# Patient Record
Sex: FEMALE | Race: OTHER | NOT HISPANIC OR LATINO | Employment: PART TIME | URBAN - METROPOLITAN AREA
[De-identification: names, ages, dates, MRNs, and addresses within clinical notes are randomized per-mention and may not be internally consistent; named-entity substitution may affect disease eponyms.]

---

## 2017-02-06 ENCOUNTER — LAB (OUTPATIENT)
Dept: INTERNAL MEDICINE | Facility: CLINIC | Age: 22
End: 2017-02-06

## 2017-02-06 DIAGNOSIS — E07.9 THYROID DISEASE: Primary | ICD-10-CM

## 2017-02-06 PROCEDURE — 36415 COLL VENOUS BLD VENIPUNCTURE: CPT | Performed by: INTERNAL MEDICINE

## 2017-02-21 DIAGNOSIS — E03.9 HYPOTHYROIDISM: ICD-10-CM

## 2017-02-21 RX ORDER — LEVOTHYROXINE SODIUM 0.07 MG/1
TABLET ORAL
Qty: 30 TABLET | Refills: 2 | Status: SHIPPED | OUTPATIENT
Start: 2017-02-21 | End: 2017-06-07 | Stop reason: SDUPTHER

## 2017-06-07 ENCOUNTER — LAB (OUTPATIENT)
Dept: INTERNAL MEDICINE | Facility: CLINIC | Age: 22
End: 2017-06-07

## 2017-06-07 DIAGNOSIS — R53.83 OTHER FATIGUE: Primary | ICD-10-CM

## 2017-06-07 DIAGNOSIS — E03.8 OTHER SPECIFIED HYPOTHYROIDISM: ICD-10-CM

## 2017-06-07 LAB — TSH SERPL DL<=0.05 MIU/L-ACNC: 5.2 MIU/ML (ref 0.35–5.35)

## 2017-06-07 PROCEDURE — 84443 ASSAY THYROID STIM HORMONE: CPT | Performed by: INTERNAL MEDICINE

## 2017-06-07 RX ORDER — LEVOTHYROXINE SODIUM 0.07 MG/1
75 TABLET ORAL DAILY
Qty: 30 TABLET | Refills: 2 | Status: SHIPPED | OUTPATIENT
Start: 2017-06-07 | End: 2017-09-01 | Stop reason: SDUPTHER

## 2017-08-25 DIAGNOSIS — H40.009 GLAUCOMA SUSPECT, UNSPECIFIED LATERALITY: Primary | ICD-10-CM

## 2017-09-01 DIAGNOSIS — E03.8 OTHER SPECIFIED HYPOTHYROIDISM: ICD-10-CM

## 2017-09-01 RX ORDER — LEVOTHYROXINE SODIUM 0.07 MG/1
TABLET ORAL
Qty: 30 TABLET | Refills: 2 | Status: SHIPPED | OUTPATIENT
Start: 2017-09-01 | End: 2017-10-23 | Stop reason: SDUPTHER

## 2017-10-23 DIAGNOSIS — E03.8 OTHER SPECIFIED HYPOTHYROIDISM: ICD-10-CM

## 2017-10-23 RX ORDER — LEVOTHYROXINE SODIUM 0.07 MG/1
75 TABLET ORAL DAILY
Qty: 30 TABLET | Refills: 2 | Status: SHIPPED | OUTPATIENT
Start: 2017-10-23 | End: 2018-01-18 | Stop reason: SDUPTHER

## 2017-10-23 RX ORDER — AMOXICILLIN 500 MG/1
500 CAPSULE ORAL 3 TIMES DAILY
Qty: 30 CAPSULE | Refills: 0 | Status: SHIPPED | OUTPATIENT
Start: 2017-10-23 | End: 2017-12-16

## 2017-12-16 RX ORDER — AZITHROMYCIN 250 MG/1
TABLET, FILM COATED ORAL
Qty: 6 TABLET | Refills: 0 | Status: SHIPPED | OUTPATIENT
Start: 2017-12-16 | End: 2018-05-25 | Stop reason: SDUPTHER

## 2018-01-18 ENCOUNTER — LAB (OUTPATIENT)
Dept: INTERNAL MEDICINE | Facility: CLINIC | Age: 23
End: 2018-01-18

## 2018-01-18 DIAGNOSIS — E03.8 OTHER SPECIFIED HYPOTHYROIDISM: ICD-10-CM

## 2018-01-18 DIAGNOSIS — R53.83 OTHER FATIGUE: Primary | ICD-10-CM

## 2018-01-18 LAB — TSH SERPL DL<=0.05 MIU/L-ACNC: 4.02 MIU/ML (ref 0.35–5.35)

## 2018-01-18 PROCEDURE — 84443 ASSAY THYROID STIM HORMONE: CPT | Performed by: INTERNAL MEDICINE

## 2018-01-18 RX ORDER — LEVOTHYROXINE SODIUM 88 UG/1
88 TABLET ORAL DAILY
Qty: 30 TABLET | Refills: 3 | Status: SHIPPED | OUTPATIENT
Start: 2018-01-18 | End: 2018-06-04 | Stop reason: SDUPTHER

## 2018-03-24 RX ORDER — ERYTHROMYCIN 5 MG/G
OINTMENT OPHTHALMIC
Qty: 3.5 G | Refills: 1 | Status: SHIPPED | OUTPATIENT
Start: 2018-03-24 | End: 2019-01-10

## 2018-05-25 RX ORDER — AZITHROMYCIN 250 MG/1
TABLET, FILM COATED ORAL
Qty: 6 TABLET | Refills: 0 | Status: SHIPPED | OUTPATIENT
Start: 2018-05-25 | End: 2019-01-10

## 2018-06-04 DIAGNOSIS — E03.8 OTHER SPECIFIED HYPOTHYROIDISM: ICD-10-CM

## 2018-06-04 RX ORDER — LEVOTHYROXINE SODIUM 88 UG/1
88 TABLET ORAL DAILY
Qty: 30 TABLET | Refills: 3 | Status: SHIPPED | OUTPATIENT
Start: 2018-06-04 | End: 2023-02-09 | Stop reason: SDUPTHER

## 2019-01-10 RX ORDER — CIPROFLOXACIN HYDROCHLORIDE 3.5 MG/ML
2 SOLUTION/ DROPS TOPICAL 4 TIMES DAILY
Qty: 5 ML | Refills: 1 | Status: SHIPPED | OUTPATIENT
Start: 2019-01-10 | End: 2019-03-25

## 2019-03-25 RX ORDER — NITROFURANTOIN 25; 75 MG/1; MG/1
100 CAPSULE ORAL 2 TIMES DAILY
Qty: 6 CAPSULE | Refills: 0 | Status: SHIPPED | OUTPATIENT
Start: 2019-03-25 | End: 2019-07-17

## 2019-06-17 RX ORDER — PREDNISONE 10 MG/1
TABLET ORAL SEE ADMIN INSTRUCTIONS
Qty: 1 EACH | Refills: 1 | Status: SHIPPED | OUTPATIENT
Start: 2019-06-17 | End: 2019-07-17

## 2019-07-17 ENCOUNTER — OFFICE VISIT (OUTPATIENT)
Dept: INTERNAL MEDICINE | Facility: CLINIC | Age: 24
End: 2019-07-17

## 2019-07-17 VITALS — TEMPERATURE: 99 F | SYSTOLIC BLOOD PRESSURE: 120 MMHG | HEART RATE: 68 BPM | DIASTOLIC BLOOD PRESSURE: 84 MMHG

## 2019-07-17 DIAGNOSIS — J00 ACUTE NASOPHARYNGITIS: ICD-10-CM

## 2019-07-17 DIAGNOSIS — E03.8 OTHER SPECIFIED HYPOTHYROIDISM: ICD-10-CM

## 2019-07-17 DIAGNOSIS — J18.9 WALKING PNEUMONIA: Primary | ICD-10-CM

## 2019-07-17 LAB
EXPIRATION DATE: NORMAL
EXPIRATION DATE: NORMAL
FLUAV AG NPH QL: NEGATIVE
FLUBV AG NPH QL: NEGATIVE
INTERNAL CONTROL: NORMAL
INTERNAL CONTROL: NORMAL
Lab: NORMAL
Lab: NORMAL
S PYO AG THROAT QL: NEGATIVE

## 2019-07-17 PROCEDURE — 80050 GENERAL HEALTH PANEL: CPT | Performed by: INTERNAL MEDICINE

## 2019-07-17 PROCEDURE — 87880 STREP A ASSAY W/OPTIC: CPT | Performed by: INTERNAL MEDICINE

## 2019-07-17 PROCEDURE — 87804 INFLUENZA ASSAY W/OPTIC: CPT | Performed by: INTERNAL MEDICINE

## 2019-07-17 PROCEDURE — 99214 OFFICE O/P EST MOD 30 MIN: CPT | Performed by: INTERNAL MEDICINE

## 2019-07-17 RX ORDER — AZITHROMYCIN 250 MG/1
TABLET, FILM COATED ORAL
Qty: 6 TABLET | Refills: 0 | Status: SHIPPED | OUTPATIENT
Start: 2019-07-17 | End: 2019-10-16 | Stop reason: SDUPTHER

## 2019-07-17 NOTE — PROGRESS NOTES
Patient is a 23 y.o. female who is here for fever.  Chief Complaint   Patient presents with   • Fever         HPI:    Here for f/u.  Today c/o 2 day hx fever to 105.  Has a cough, nonproductive.  No HA or ear pain.  No GI issues.  Little energy.  Mild sore throat from coughing.  Off synthroid for some time.  No temperature intolerance.  No skin rash.     History:     Patient Active Problem List   Diagnosis   • Atopic rhinitis   • Indigestion   • Fatigue   • Walking pneumonia   • Other specified hypothyroidism   • Acute nasopharyngitis       No past medical history on file.    Past Surgical History:   Procedure Laterality Date   • KNEE ACL RECONSTRUCTION  06/2014       Current Outpatient Medications on File Prior to Visit   Medication Sig   • levothyroxine (SYNTHROID, LEVOTHROID) 88 MCG tablet Take 1 tablet by mouth Daily.   • [DISCONTINUED] nitrofurantoin, macrocrystal-monohydrate, (MACROBID) 100 MG capsule Take 1 capsule by mouth 2 (Two) Times a Day.   • [DISCONTINUED] predniSONE (DELTASONE) 10 MG (48) tablet pack Take  by mouth See Admin Instructions.     No current facility-administered medications on file prior to visit.        Family History   Problem Relation Age of Onset   • Thyroid disease Mother        Social History     Socioeconomic History   • Marital status: Single     Spouse name: Not on file   • Number of children: Not on file   • Years of education: Not on file   • Highest education level: Not on file   Tobacco Use   • Smoking status: Never Smoker         Review of Systems   Constitutional: Positive for fatigue and fever. Negative for chills and unexpected weight change.   HENT: Positive for sore throat. Negative for congestion, ear pain, hearing loss, rhinorrhea, sinus pressure and trouble swallowing.    Eyes: Negative for discharge and itching.   Respiratory: Positive for cough. Negative for chest tightness and shortness of breath.    Cardiovascular: Negative for chest pain, palpitations and leg  swelling.   Gastrointestinal: Negative for abdominal pain, blood in stool, constipation, diarrhea and vomiting.   Endocrine: Negative for polydipsia and polyuria.   Genitourinary: Negative for difficulty urinating, dysuria, enuresis, frequency, hematuria and urgency.   Musculoskeletal: Negative for arthralgias, back pain, gait problem and joint swelling.   Skin: Negative for rash and wound.   Allergic/Immunologic: Negative for immunocompromised state.   Neurological: Negative for dizziness, syncope, weakness, light-headedness, numbness and headaches.   Hematological: Does not bruise/bleed easily.   Psychiatric/Behavioral: Negative for behavioral problems, dysphoric mood and sleep disturbance. The patient is not nervous/anxious.        /84   Pulse 68   Temp 99 °F (37.2 °C) (Temporal)       Physical Exam   Constitutional: She is oriented to person, place, and time. She appears well-developed and well-nourished.   HENT:   Head: Normocephalic and atraumatic.   Right Ear: External ear normal.   Left Ear: External ear normal.   Mouth/Throat: Oropharynx is clear and moist.   Eyes: Conjunctivae and EOM are normal.   Neck: Normal range of motion. Neck supple.   Cardiovascular: Normal rate, regular rhythm and normal heart sounds.   Pulmonary/Chest: Effort normal. She has rales (right).   Abdominal: Soft. Bowel sounds are normal.   Musculoskeletal: Normal range of motion.   Lymphadenopathy:     She has no cervical adenopathy.   Neurological: She is alert and oriented to person, place, and time.   Skin: Skin is warm and dry.   Psychiatric: She has a normal mood and affect. Her behavior is normal. Thought content normal.       Procedure:      Discussion/Summary:    PNA-rx zpac  URI-conservative mgmt  Hypothyroid-recheck labs today    Call if not better    Current Outpatient Medications:   •  azithromycin (ZITHROMAX Z-REANNA) 250 MG tablet, Take 2 tablets the first day, then 1 tablet daily for 4 days., Disp: 6 tablet, Rfl:  0  •  levothyroxine (SYNTHROID, LEVOTHROID) 88 MCG tablet, Take 1 tablet by mouth Daily., Disp: 30 tablet, Rfl: 3        Sharon was seen today for fever.    Diagnoses and all orders for this visit:    Walking pneumonia  -     azithromycin (ZITHROMAX Z-REANNA) 250 MG tablet; Take 2 tablets the first day, then 1 tablet daily for 4 days.  -     CBC (No Diff)  -     Comprehensive Metabolic Panel    Other specified hypothyroidism  -     TSH    Acute nasopharyngitis  -     POC Rapid Strep A  -     POC Influenza A / B

## 2019-07-18 LAB
ALBUMIN SERPL-MCNC: 4.4 G/DL (ref 3.5–5.2)
ALBUMIN/GLOB SERPL: 1.3 G/DL
ALP SERPL-CCNC: 51 U/L (ref 39–117)
ALT SERPL W P-5'-P-CCNC: 10 U/L (ref 1–33)
ANION GAP SERPL CALCULATED.3IONS-SCNC: 12.6 MMOL/L (ref 5–15)
AST SERPL-CCNC: 16 U/L (ref 1–32)
BILIRUB SERPL-MCNC: 0.3 MG/DL (ref 0.2–1.2)
BUN BLD-MCNC: 7 MG/DL (ref 6–20)
BUN/CREAT SERPL: 7.7 (ref 7–25)
CALCIUM SPEC-SCNC: 9.8 MG/DL (ref 8.6–10.5)
CHLORIDE SERPL-SCNC: 100 MMOL/L (ref 98–107)
CO2 SERPL-SCNC: 24.4 MMOL/L (ref 22–29)
CREAT BLD-MCNC: 0.91 MG/DL (ref 0.57–1)
DEPRECATED RDW RBC AUTO: 44.8 FL (ref 37–54)
ERYTHROCYTE [DISTWIDTH] IN BLOOD BY AUTOMATED COUNT: 12.2 % (ref 12.3–15.4)
GFR SERPL CREATININE-BSD FRML MDRD: 78 ML/MIN/1.73
GFR SERPL CREATININE-BSD FRML MDRD: 95 ML/MIN/1.73
GLOBULIN UR ELPH-MCNC: 3.3 GM/DL
GLUCOSE BLD-MCNC: 87 MG/DL (ref 65–99)
HCT VFR BLD AUTO: 49.8 % (ref 34–46.6)
HGB BLD-MCNC: 15.4 G/DL (ref 12–15.9)
MCH RBC QN AUTO: 30.8 PG (ref 26.6–33)
MCHC RBC AUTO-ENTMCNC: 30.9 G/DL (ref 31.5–35.7)
MCV RBC AUTO: 99.6 FL (ref 79–97)
PLATELET # BLD AUTO: 240 10*3/MM3 (ref 140–450)
PMV BLD AUTO: 9.9 FL (ref 6–12)
POTASSIUM BLD-SCNC: 4.3 MMOL/L (ref 3.5–5.2)
PROT SERPL-MCNC: 7.7 G/DL (ref 6–8.5)
RBC # BLD AUTO: 5 10*6/MM3 (ref 3.77–5.28)
SODIUM BLD-SCNC: 137 MMOL/L (ref 136–145)
TSH SERPL DL<=0.05 MIU/L-ACNC: 3.4 MIU/ML (ref 0.27–4.2)
WBC NRBC COR # BLD: 6.02 10*3/MM3 (ref 3.4–10.8)

## 2019-09-24 ENCOUNTER — TELEPHONE (OUTPATIENT)
Dept: INTERNAL MEDICINE | Facility: CLINIC | Age: 24
End: 2019-09-24

## 2019-09-24 NOTE — TELEPHONE ENCOUNTER
Pt father called stating daughter is requesting her immunization records. They can be faxed to Pt father at F: [phone].    Please advise,    Thank you.

## 2019-10-16 DIAGNOSIS — J18.9 WALKING PNEUMONIA: ICD-10-CM

## 2019-10-16 RX ORDER — AZITHROMYCIN 250 MG/1
TABLET, FILM COATED ORAL
Qty: 6 TABLET | Refills: 0 | Status: SHIPPED | OUTPATIENT
Start: 2019-10-16 | End: 2019-12-21 | Stop reason: SDUPTHER

## 2019-12-21 DIAGNOSIS — J18.9 WALKING PNEUMONIA: ICD-10-CM

## 2019-12-21 RX ORDER — AZITHROMYCIN 250 MG/1
TABLET, FILM COATED ORAL
Qty: 6 TABLET | Refills: 0 | Status: SHIPPED | OUTPATIENT
Start: 2019-12-21 | End: 2021-02-04 | Stop reason: SDUPTHER

## 2021-02-04 DIAGNOSIS — J18.9 WALKING PNEUMONIA: ICD-10-CM

## 2021-02-04 RX ORDER — AZITHROMYCIN 250 MG/1
TABLET, FILM COATED ORAL
Qty: 6 TABLET | Refills: 0 | Status: SHIPPED | OUTPATIENT
Start: 2021-02-04

## 2021-07-03 DIAGNOSIS — J18.9 WALKING PNEUMONIA: ICD-10-CM

## 2021-07-06 RX ORDER — AZITHROMYCIN 250 MG/1
TABLET, FILM COATED ORAL
Qty: 6 TABLET | Refills: 0 | OUTPATIENT
Start: 2021-07-06

## 2023-02-06 ENCOUNTER — LAB (OUTPATIENT)
Dept: LAB | Facility: HOSPITAL | Age: 28
End: 2023-02-06
Payer: COMMERCIAL

## 2023-02-06 DIAGNOSIS — R53.83 OTHER FATIGUE: Primary | ICD-10-CM

## 2023-02-06 DIAGNOSIS — E03.8 OTHER SPECIFIED HYPOTHYROIDISM: ICD-10-CM

## 2023-02-06 DIAGNOSIS — Z02.0 ENCOUNTER FOR PRE-SCHOOL HEALTH EXAMINATION: ICD-10-CM

## 2023-02-06 LAB
ALBUMIN SERPL-MCNC: 4.5 G/DL (ref 3.5–5.2)
ALBUMIN/GLOB SERPL: 1.5 G/DL
ALP SERPL-CCNC: 85 U/L (ref 39–117)
ALT SERPL W P-5'-P-CCNC: 7 U/L (ref 1–33)
ANION GAP SERPL CALCULATED.3IONS-SCNC: 11.6 MMOL/L (ref 5–15)
AST SERPL-CCNC: 15 U/L (ref 1–32)
BILIRUB SERPL-MCNC: 0.2 MG/DL (ref 0–1.2)
BUN SERPL-MCNC: 9 MG/DL (ref 6–20)
BUN/CREAT SERPL: 10.7 (ref 7–25)
CALCIUM SPEC-SCNC: 9.5 MG/DL (ref 8.6–10.5)
CHLORIDE SERPL-SCNC: 104 MMOL/L (ref 98–107)
CO2 SERPL-SCNC: 22.4 MMOL/L (ref 22–29)
CREAT SERPL-MCNC: 0.84 MG/DL (ref 0.57–1)
DEPRECATED RDW RBC AUTO: 41.3 FL (ref 37–54)
EGFRCR SERPLBLD CKD-EPI 2021: 99 ML/MIN/1.73
ERYTHROCYTE [DISTWIDTH] IN BLOOD BY AUTOMATED COUNT: 12.9 % (ref 12.3–15.4)
GLOBULIN UR ELPH-MCNC: 3.1 GM/DL
GLUCOSE SERPL-MCNC: 88 MG/DL (ref 65–99)
HCT VFR BLD AUTO: 45.4 % (ref 34–46.6)
HGB BLD-MCNC: 14.7 G/DL (ref 12–15.9)
MCH RBC QN AUTO: 28.5 PG (ref 26.6–33)
MCHC RBC AUTO-ENTMCNC: 32.4 G/DL (ref 31.5–35.7)
MCV RBC AUTO: 88.2 FL (ref 79–97)
PLATELET # BLD AUTO: 388 10*3/MM3 (ref 140–450)
PMV BLD AUTO: 9.4 FL (ref 6–12)
POTASSIUM SERPL-SCNC: 4 MMOL/L (ref 3.5–5.2)
PROT SERPL-MCNC: 7.6 G/DL (ref 6–8.5)
RBC # BLD AUTO: 5.15 10*6/MM3 (ref 3.77–5.28)
SODIUM SERPL-SCNC: 138 MMOL/L (ref 136–145)
TSH SERPL DL<=0.05 MIU/L-ACNC: 7.16 UIU/ML (ref 0.27–4.2)
VIT B12 BLD-MCNC: 648 PG/ML (ref 211–946)
WBC NRBC COR # BLD: 5.87 10*3/MM3 (ref 3.4–10.8)

## 2023-02-06 PROCEDURE — 86480 TB TEST CELL IMMUN MEASURE: CPT | Performed by: INTERNAL MEDICINE

## 2023-02-06 PROCEDURE — 82607 VITAMIN B-12: CPT | Performed by: INTERNAL MEDICINE

## 2023-02-06 PROCEDURE — 80050 GENERAL HEALTH PANEL: CPT | Performed by: INTERNAL MEDICINE

## 2023-02-09 DIAGNOSIS — E03.8 OTHER SPECIFIED HYPOTHYROIDISM: ICD-10-CM

## 2023-02-09 LAB
GAMMA INTERFERON BACKGROUND BLD IA-ACNC: 0.08 IU/ML
M TB IFN-G BLD-IMP: NEGATIVE
M TB IFN-G CD4+ T-CELLS BLD-ACNC: 0.07 IU/ML
M TBIFN-G CD4+ CD8+T-CELLS BLD-ACNC: 0.07 IU/ML
MITOGEN IGNF BLD-ACNC: >10 IU/ML
SERVICE CMNT-IMP: NORMAL

## 2023-02-09 RX ORDER — LEVOTHYROXINE SODIUM 0.05 MG/1
50 TABLET ORAL DAILY
Qty: 90 TABLET | Refills: 3 | Status: SHIPPED | OUTPATIENT
Start: 2023-02-09

## 2024-02-21 ENCOUNTER — TELEPHONE (OUTPATIENT)
Dept: INTERNAL MEDICINE | Facility: CLINIC | Age: 29
End: 2024-02-21
Payer: COMMERCIAL

## 2024-02-21 DIAGNOSIS — Z00.00 ROUTINE GENERAL MEDICAL EXAMINATION AT A HEALTH CARE FACILITY: Primary | ICD-10-CM

## 2024-02-22 ENCOUNTER — LAB (OUTPATIENT)
Dept: INTERNAL MEDICINE | Facility: CLINIC | Age: 29
End: 2024-02-22
Payer: COMMERCIAL

## 2024-02-22 DIAGNOSIS — Z00.00 ROUTINE GENERAL MEDICAL EXAMINATION AT A HEALTH CARE FACILITY: ICD-10-CM

## 2024-02-22 LAB
ALBUMIN SERPL-MCNC: 4.1 G/DL (ref 3.5–5.2)
ALBUMIN/GLOB SERPL: 1.4 G/DL
ALP SERPL-CCNC: 83 U/L (ref 39–117)
ALT SERPL W P-5'-P-CCNC: 12 U/L (ref 1–33)
ANION GAP SERPL CALCULATED.3IONS-SCNC: 13.3 MMOL/L (ref 5–15)
AST SERPL-CCNC: 17 U/L (ref 1–32)
BILIRUB SERPL-MCNC: 0.2 MG/DL (ref 0–1.2)
BUN SERPL-MCNC: 11 MG/DL (ref 6–20)
BUN/CREAT SERPL: 12.2 (ref 7–25)
CALCIUM SPEC-SCNC: 9 MG/DL (ref 8.6–10.5)
CHLORIDE SERPL-SCNC: 102 MMOL/L (ref 98–107)
CHOLEST SERPL-MCNC: 168 MG/DL (ref 0–200)
CO2 SERPL-SCNC: 21.7 MMOL/L (ref 22–29)
CREAT SERPL-MCNC: 0.9 MG/DL (ref 0.57–1)
DEPRECATED RDW RBC AUTO: 42 FL (ref 37–54)
EGFRCR SERPLBLD CKD-EPI 2021: 90.6 ML/MIN/1.73
ERYTHROCYTE [DISTWIDTH] IN BLOOD BY AUTOMATED COUNT: 12.9 % (ref 12.3–15.4)
GLOBULIN UR ELPH-MCNC: 3 GM/DL
GLUCOSE SERPL-MCNC: 87 MG/DL (ref 65–99)
HCT VFR BLD AUTO: 40.9 % (ref 34–46.6)
HDLC SERPL-MCNC: 44 MG/DL (ref 40–60)
HGB BLD-MCNC: 13.7 G/DL (ref 12–15.9)
LDLC SERPL CALC-MCNC: 112 MG/DL (ref 0–100)
LDLC/HDLC SERPL: 2.55 {RATIO}
MCH RBC QN AUTO: 29.9 PG (ref 26.6–33)
MCHC RBC AUTO-ENTMCNC: 33.5 G/DL (ref 31.5–35.7)
MCV RBC AUTO: 89.3 FL (ref 79–97)
PLATELET # BLD AUTO: 272 10*3/MM3 (ref 140–450)
PMV BLD AUTO: 10 FL (ref 6–12)
POTASSIUM SERPL-SCNC: 3.8 MMOL/L (ref 3.5–5.2)
PROT SERPL-MCNC: 7.1 G/DL (ref 6–8.5)
RBC # BLD AUTO: 4.58 10*6/MM3 (ref 3.77–5.28)
SODIUM SERPL-SCNC: 137 MMOL/L (ref 136–145)
TRIGL SERPL-MCNC: 59 MG/DL (ref 0–150)
TSH SERPL DL<=0.05 MIU/L-ACNC: 7.27 UIU/ML (ref 0.27–4.2)
VIT B12 BLD-MCNC: 530 PG/ML (ref 211–946)
VLDLC SERPL-MCNC: 12 MG/DL (ref 5–40)
WBC NRBC COR # BLD AUTO: 6.68 10*3/MM3 (ref 3.4–10.8)

## 2024-02-22 PROCEDURE — 80050 GENERAL HEALTH PANEL: CPT | Performed by: INTERNAL MEDICINE

## 2024-02-22 PROCEDURE — 86480 TB TEST CELL IMMUN MEASURE: CPT | Performed by: INTERNAL MEDICINE

## 2024-02-22 PROCEDURE — 82607 VITAMIN B-12: CPT | Performed by: INTERNAL MEDICINE

## 2024-02-22 PROCEDURE — 80061 LIPID PANEL: CPT | Performed by: INTERNAL MEDICINE

## 2024-02-22 PROCEDURE — 36415 COLL VENOUS BLD VENIPUNCTURE: CPT | Performed by: INTERNAL MEDICINE

## 2024-02-24 LAB
GAMMA INTERFERON BACKGROUND BLD IA-ACNC: 0.12 IU/ML
M TB IFN-G BLD-IMP: NEGATIVE
M TB IFN-G CD4+ BCKGRND COR BLD-ACNC: 0.14 IU/ML
M TB IFN-G CD4+CD8+ BCKGRND COR BLD-ACNC: 0.15 IU/ML
MITOGEN IGNF BCKGRD COR BLD-ACNC: >10 IU/ML
SERVICE CMNT-IMP: NORMAL

## 2024-02-26 DIAGNOSIS — E03.8 OTHER SPECIFIED HYPOTHYROIDISM: ICD-10-CM

## 2024-02-26 RX ORDER — LEVOTHYROXINE SODIUM 0.07 MG/1
75 TABLET ORAL
Qty: 90 TABLET | Refills: 1 | Status: SHIPPED | OUTPATIENT
Start: 2024-02-26

## 2024-03-12 ENCOUNTER — OFFICE VISIT (OUTPATIENT)
Dept: INTERNAL MEDICINE | Facility: CLINIC | Age: 29
End: 2024-03-12
Payer: COMMERCIAL

## 2024-03-12 VITALS
WEIGHT: 230 LBS | DIASTOLIC BLOOD PRESSURE: 90 MMHG | HEART RATE: 89 BPM | SYSTOLIC BLOOD PRESSURE: 120 MMHG | HEIGHT: 67 IN | OXYGEN SATURATION: 98 % | BODY MASS INDEX: 36.1 KG/M2

## 2024-03-12 DIAGNOSIS — E03.9 ACQUIRED HYPOTHYROIDISM: Primary | ICD-10-CM

## 2024-03-12 DIAGNOSIS — Z00.00 ROUTINE GENERAL MEDICAL EXAMINATION AT A HEALTH CARE FACILITY: ICD-10-CM

## 2024-03-12 DIAGNOSIS — E78.00 ELEVATED LDL CHOLESTEROL LEVEL: ICD-10-CM

## 2024-03-12 NOTE — PROGRESS NOTES
Patient is a 28 y.o. female who is here for a physical.  Chief Complaint   Patient presents with    Annual Exam         HPI:    Here for CPE.     History:     Patient Active Problem List   Diagnosis    Atopic rhinitis    Indigestion    Fatigue    Walking pneumonia    Acquired hypothyroidism    Acute nasopharyngitis    Elevated LDL cholesterol level       Past Medical History:   Diagnosis Date    ADHD (attention deficit hyperactivity disorder) 2017    Depression 2021    Glaucoma 2015    Monitored yearly    Hypothyroidism     Obesity 2021    Noted after giving birth       Past Surgical History:   Procedure Laterality Date    KNEE ACL RECONSTRUCTION  06/2014       Current Outpatient Medications on File Prior to Visit   Medication Sig    levothyroxine (SYNTHROID, LEVOTHROID) 75 MCG tablet Take 1 tablet by mouth Every Morning.    [DISCONTINUED] azithromycin (Zithromax Z-Ike) 250 MG tablet Take 2 tablets the first day, then 1 tablet daily for 4 days.     No current facility-administered medications on file prior to visit.       Family History   Problem Relation Age of Onset    Thyroid disease Mother     Depression Mother     Drug abuse Mother         Opioid abuse (perscribed only)    Mental illness Mother         Depression and bipolar    Diabetes Maternal Grandmother     Hyperlipidemia Maternal Grandmother         Extremely obese    Thyroid disease Maternal Grandmother     Vision loss Paternal Grandmother         Glaucoma    Drug abuse Sister         Opiod abuse leading to street drug use. DOC  herion and meth    Early death Sister         Cause of death never found. Autopsy was not done. Only info is drugs were not in her system. Had history of drug related heart problems that were uncontrolled.    Mental illness Sister         Depression and bipolar       Social History     Socioeconomic History    Marital status: Significant Other   Tobacco Use    Smoking status: Never    Smokeless tobacco: Never   Substance and  "Sexual Activity    Alcohol use: Not Currently     Comment: Glass of wine once a month    Drug use: Never    Sexual activity: Yes     Partners: Male     Birth control/protection: Condom, None         Review of Systems   Constitutional:  Positive for fatigue. Negative for chills and unexpected weight change.   HENT:  Negative for congestion, ear pain, hearing loss, rhinorrhea, sinus pressure and trouble swallowing.    Eyes:  Negative for discharge and itching.   Respiratory:  Negative for chest tightness and shortness of breath.    Cardiovascular:  Negative for chest pain, palpitations and leg swelling.   Gastrointestinal:  Negative for abdominal pain, blood in stool, constipation, diarrhea and vomiting.   Endocrine: Negative for polydipsia and polyuria.   Genitourinary:  Negative for difficulty urinating, dysuria, enuresis, frequency, hematuria and urgency.   Musculoskeletal:  Negative for arthralgias, back pain, gait problem and joint swelling.   Skin:  Negative for rash and wound.   Allergic/Immunologic: Negative for immunocompromised state.   Neurological:  Negative for dizziness, syncope, weakness, light-headedness, numbness and headaches.   Hematological:  Does not bruise/bleed easily.   Psychiatric/Behavioral:  Positive for decreased concentration. Negative for behavioral problems, dysphoric mood and sleep disturbance. The patient is not nervous/anxious.        /90   Pulse 89   Ht 170.2 cm (67.01\")   Wt 104 kg (230 lb)   SpO2 98%   BMI 36.01 kg/m²       Physical Exam  Constitutional:       Appearance: Normal appearance. She is well-developed. She is obese.   HENT:      Head: Normocephalic and atraumatic.      Right Ear: External ear normal.      Left Ear: External ear normal.      Nose: Nose normal.      Mouth/Throat:      Mouth: Mucous membranes are moist.      Pharynx: Oropharynx is clear.   Eyes:      Extraocular Movements: Extraocular movements intact.      Conjunctiva/sclera: Conjunctivae " normal.      Pupils: Pupils are equal, round, and reactive to light.   Cardiovascular:      Rate and Rhythm: Normal rate and regular rhythm.      Heart sounds: Normal heart sounds.   Pulmonary:      Effort: Pulmonary effort is normal.      Breath sounds: Normal breath sounds.   Abdominal:      General: Bowel sounds are normal.      Palpations: Abdomen is soft.   Musculoskeletal:         General: Normal range of motion.      Cervical back: Normal range of motion and neck supple.   Lymphadenopathy:      Cervical: No cervical adenopathy.   Skin:     General: Skin is warm and dry.   Neurological:      General: No focal deficit present.      Mental Status: She is alert and oriented to person, place, and time.   Psychiatric:         Mood and Affect: Mood normal.         Behavior: Behavior normal.         Thought Content: Thought content normal.         Procedure:      Discussion/Summary:    HME-counseled on diet and exercise, fasting labs dw patient  Hypothyroid-recent change to increase dose  Elevated BP-goal of 130/80  Elevated LDL-improved, will recheck on rtc    Reviewed the following with the patient: advised patient to avoid alcoholic beverages, encouraged patient to exercise 5-7 days per week for 30 minutes at a time, ideal body weight discussed with patient, and weight loss encouraged.      Current Outpatient Medications:     levothyroxine (SYNTHROID, LEVOTHROID) 75 MCG tablet, Take 1 tablet by mouth Every Morning., Disp: 90 tablet, Rfl: 1        Diagnoses and all orders for this visit:    1. Acquired hypothyroidism (Primary)  -     TSH; Future    2. Routine general medical examination at a health care facility    3. Elevated LDL cholesterol level

## 2024-04-30 ENCOUNTER — LAB (OUTPATIENT)
Dept: INTERNAL MEDICINE | Facility: CLINIC | Age: 29
End: 2024-04-30
Payer: COMMERCIAL

## 2024-04-30 DIAGNOSIS — E03.8 OTHER SPECIFIED HYPOTHYROIDISM: ICD-10-CM

## 2024-04-30 DIAGNOSIS — E03.9 ACQUIRED HYPOTHYROIDISM: ICD-10-CM

## 2024-04-30 LAB — TSH SERPL DL<=0.05 MIU/L-ACNC: 5.02 UIU/ML (ref 0.27–4.2)

## 2024-04-30 PROCEDURE — 36415 COLL VENOUS BLD VENIPUNCTURE: CPT | Performed by: INTERNAL MEDICINE

## 2024-04-30 PROCEDURE — 84443 ASSAY THYROID STIM HORMONE: CPT | Performed by: INTERNAL MEDICINE

## 2024-04-30 RX ORDER — LEVOTHYROXINE SODIUM 88 UG/1
88 TABLET ORAL
Qty: 90 TABLET | Refills: 1 | Status: SHIPPED | OUTPATIENT
Start: 2024-04-30

## 2024-05-14 ENCOUNTER — LAB (OUTPATIENT)
Dept: INTERNAL MEDICINE | Facility: CLINIC | Age: 29
End: 2024-05-14

## 2024-05-14 DIAGNOSIS — Z32.01 POSITIVE PREGNANCY TEST: Primary | ICD-10-CM

## 2024-05-14 DIAGNOSIS — Z3A.01 LESS THAN 8 WEEKS GESTATION OF PREGNANCY: Primary | ICD-10-CM

## 2024-05-14 LAB — HCG INTACT+B SERPL-ACNC: 634 MIU/ML

## 2024-05-14 PROCEDURE — 84702 CHORIONIC GONADOTROPIN TEST: CPT | Performed by: INTERNAL MEDICINE

## 2024-05-14 PROCEDURE — 36415 COLL VENOUS BLD VENIPUNCTURE: CPT | Performed by: INTERNAL MEDICINE

## 2024-09-04 DIAGNOSIS — E03.8 OTHER SPECIFIED HYPOTHYROIDISM: Primary | ICD-10-CM

## 2024-09-11 ENCOUNTER — LAB (OUTPATIENT)
Dept: INTERNAL MEDICINE | Facility: CLINIC | Age: 29
End: 2024-09-11
Payer: COMMERCIAL

## 2024-09-11 DIAGNOSIS — E03.9 ACQUIRED HYPOTHYROIDISM: Primary | ICD-10-CM

## 2024-09-11 LAB — TSH SERPL DL<=0.05 MIU/L-ACNC: 9.47 UIU/ML (ref 0.27–4.2)

## 2024-09-11 PROCEDURE — 84443 ASSAY THYROID STIM HORMONE: CPT | Performed by: INTERNAL MEDICINE

## 2024-09-11 PROCEDURE — 36415 COLL VENOUS BLD VENIPUNCTURE: CPT | Performed by: INTERNAL MEDICINE

## 2024-09-12 DIAGNOSIS — E03.8 OTHER SPECIFIED HYPOTHYROIDISM: ICD-10-CM

## 2024-09-12 RX ORDER — LEVOTHYROXINE SODIUM 100 UG/1
100 TABLET ORAL
Qty: 90 TABLET | Refills: 1 | Status: SHIPPED | OUTPATIENT
Start: 2024-09-12

## 2024-09-25 ENCOUNTER — TELEPHONE (OUTPATIENT)
Dept: INTERNAL MEDICINE | Facility: CLINIC | Age: 29
End: 2024-09-25
Payer: COMMERCIAL

## 2024-09-26 DIAGNOSIS — Z3A.24 24 WEEKS GESTATION OF PREGNANCY: Primary | ICD-10-CM

## 2024-10-15 ENCOUNTER — INITIAL PRENATAL (OUTPATIENT)
Dept: OBSTETRICS AND GYNECOLOGY | Facility: CLINIC | Age: 29
End: 2024-10-15
Payer: COMMERCIAL

## 2024-10-15 VITALS — WEIGHT: 240 LBS | SYSTOLIC BLOOD PRESSURE: 138 MMHG | DIASTOLIC BLOOD PRESSURE: 84 MMHG | BODY MASS INDEX: 37.58 KG/M2

## 2024-10-15 DIAGNOSIS — E03.9 HYPOTHYROIDISM (ACQUIRED): ICD-10-CM

## 2024-10-15 DIAGNOSIS — O09.92 SUPERVISION OF HIGH RISK PREGNANCY IN SECOND TRIMESTER: Primary | ICD-10-CM

## 2024-10-15 LAB
AMPHET+METHAMPHET UR QL: NEGATIVE
AMPHETAMINES UR QL: NEGATIVE
BARBITURATES UR QL SCN: NEGATIVE
BENZODIAZ UR QL SCN: NEGATIVE
BUPRENORPHINE SERPL-MCNC: NEGATIVE NG/ML
CANNABINOIDS SERPL QL: NEGATIVE
COCAINE UR QL: NEGATIVE
FENTANYL UR-MCNC: NEGATIVE NG/ML
METHADONE UR QL SCN: NEGATIVE
OPIATES UR QL: NEGATIVE
OXYCODONE UR QL SCN: NEGATIVE
PCP UR QL SCN: NEGATIVE
TRICYCLICS UR QL SCN: NEGATIVE

## 2024-10-15 PROCEDURE — 87491 CHLMYD TRACH DNA AMP PROBE: CPT | Performed by: STUDENT IN AN ORGANIZED HEALTH CARE EDUCATION/TRAINING PROGRAM

## 2024-10-15 PROCEDURE — 80307 DRUG TEST PRSMV CHEM ANLYZR: CPT | Performed by: STUDENT IN AN ORGANIZED HEALTH CARE EDUCATION/TRAINING PROGRAM

## 2024-10-15 PROCEDURE — 87591 N.GONORRHOEAE DNA AMP PROB: CPT | Performed by: STUDENT IN AN ORGANIZED HEALTH CARE EDUCATION/TRAINING PROGRAM

## 2024-10-15 PROCEDURE — 87563 M. GENITALIUM AMP PROBE: CPT | Performed by: STUDENT IN AN ORGANIZED HEALTH CARE EDUCATION/TRAINING PROGRAM

## 2024-10-15 NOTE — PROGRESS NOTES
Subjective   Chief Complaint   Patient presents with    Initial Prenatal Visit     Transfer of care / US done today       Sharon Delgadillo is a 29 y.o.  at 27w1d who presents as a transfer of care for prenatal care. Overall, she is feeling well. She denies vaginal bleeding, leakage of fluid or contractions. Baby is moving. She has no complaints today.     Pregnancy is complicated by:  - Hypothyroidism, on Synthroid, which has been managed by PCP   - Hx MR BP x 1 at 20 weeks, no history of chronic HTN   - First child with clubbed foot     Objective     /84   Wt 109 kg (240 lb)   BMI 37.58 kg/m²    General: No acute distress, sitting comfortably   CV: Regular heart rate  Lungs: Breathing unlabored  Abdomen: Soft, nontender, gravid,S=D   Pelvic: deferred      ASSESSMENT/PLAN    Routine prenatal care   - The problem list for pregnancy was updated today  - Prenatal labs: In care everywhere.   Urine culture pos on initial visit with neg CARLTON    A pos ab neg, CBC wnl, HIV neg Rubella immune VZV immune Hep B neg Hep C neg   - Testing for GC / Chlamydia / trichomonas  was not done -- do not see in care everywhere. Will need at next visit  - Genetic testing reviewed:  declines  - Prenatal anatomy ultrasound seen in care everywhere - normal Level 2    Growth US normal today   - 28 weeks: Tdap, GCT, H/H, RPR testing - ordered today. Plans to do Tdap at work   - 36 weeks: GBS testing   - Recommend prenatal vitamins   - Discussed vaccine recommendations for influenza and COVID at any gestational age -  plans to do at work   - Discussed RSV vaccination 32-36w from Sept -     - Information reviewed: call coverage groups  2. Hypothyroidism   - TSH and free T4 ordered today     Follow up in 2 weeks.     This note was electronically signed.    Lori Chaudhari MD  Obstetrics and Gynecology  [facility]

## 2024-10-19 ENCOUNTER — LAB (OUTPATIENT)
Dept: LAB | Facility: HOSPITAL | Age: 29
End: 2024-10-19
Payer: COMMERCIAL

## 2024-10-19 DIAGNOSIS — O09.92 SUPERVISION OF HIGH RISK PREGNANCY IN SECOND TRIMESTER: ICD-10-CM

## 2024-10-19 LAB
ABO GROUP BLD: NORMAL
BLD GP AB SCN SERPL QL: NEGATIVE
C TRACH RRNA UR QL NAA+PROBE: NEGATIVE
DEPRECATED RDW RBC AUTO: 42.2 FL (ref 37–54)
ERYTHROCYTE [DISTWIDTH] IN BLOOD BY AUTOMATED COUNT: 12.3 % (ref 12.3–15.4)
GLUCOSE 1H P 100 G GLC PO SERPL-MCNC: 133 MG/DL (ref 65–139)
HCT VFR BLD AUTO: 37.5 % (ref 34–46.6)
HGB BLD-MCNC: 12.8 G/DL (ref 12–15.9)
M GENITALIUM DNA UR QL NAA+PROBE: NEGATIVE
MCH RBC QN AUTO: 32 PG (ref 26.6–33)
MCHC RBC AUTO-ENTMCNC: 34.1 G/DL (ref 31.5–35.7)
MCV RBC AUTO: 93.8 FL (ref 79–97)
N GONORRHOEA RRNA UR QL NAA+PROBE: NEGATIVE
PLATELET # BLD AUTO: 287 10*3/MM3 (ref 140–450)
PMV BLD AUTO: 9.8 FL (ref 6–12)
RBC # BLD AUTO: 4 10*6/MM3 (ref 3.77–5.28)
RH BLD: POSITIVE
T4 FREE SERPL-MCNC: 0.79 NG/DL (ref 0.92–1.68)
TSH SERPL DL<=0.05 MIU/L-ACNC: 5.77 UIU/ML (ref 0.27–4.2)
WBC NRBC COR # BLD AUTO: 9.53 10*3/MM3 (ref 3.4–10.8)

## 2024-10-19 PROCEDURE — 82948 REAGENT STRIP/BLOOD GLUCOSE: CPT

## 2024-10-19 PROCEDURE — 82950 GLUCOSE TEST: CPT

## 2024-10-19 PROCEDURE — 36415 COLL VENOUS BLD VENIPUNCTURE: CPT

## 2024-10-19 PROCEDURE — 86592 SYPHILIS TEST NON-TREP QUAL: CPT

## 2024-10-19 PROCEDURE — 86901 BLOOD TYPING SEROLOGIC RH(D): CPT

## 2024-10-19 PROCEDURE — 86900 BLOOD TYPING SEROLOGIC ABO: CPT

## 2024-10-19 PROCEDURE — 84443 ASSAY THYROID STIM HORMONE: CPT

## 2024-10-19 PROCEDURE — 86850 RBC ANTIBODY SCREEN: CPT

## 2024-10-19 PROCEDURE — 84439 ASSAY OF FREE THYROXINE: CPT

## 2024-10-19 PROCEDURE — 85027 COMPLETE CBC AUTOMATED: CPT

## 2024-10-20 LAB — RPR SER QL: NORMAL

## 2024-10-21 ENCOUNTER — DOCUMENTATION (OUTPATIENT)
Dept: OBSTETRICS AND GYNECOLOGY | Facility: CLINIC | Age: 29
End: 2024-10-21
Payer: COMMERCIAL

## 2024-10-21 RX ORDER — LEVOTHYROXINE SODIUM 112 UG/1
112 TABLET ORAL DAILY
Qty: 30 TABLET | Refills: 11 | Status: SHIPPED | OUTPATIENT
Start: 2024-10-21 | End: 2025-10-21

## 2024-10-30 ENCOUNTER — ROUTINE PRENATAL (OUTPATIENT)
Dept: OBSTETRICS AND GYNECOLOGY | Facility: CLINIC | Age: 29
End: 2024-10-30
Payer: COMMERCIAL

## 2024-10-30 VITALS — WEIGHT: 241 LBS | DIASTOLIC BLOOD PRESSURE: 88 MMHG | SYSTOLIC BLOOD PRESSURE: 144 MMHG | BODY MASS INDEX: 37.74 KG/M2

## 2024-10-30 DIAGNOSIS — O09.93 SUPERVISION OF HIGH RISK PREGNANCY IN THIRD TRIMESTER: ICD-10-CM

## 2024-10-30 DIAGNOSIS — E03.9 ACQUIRED HYPOTHYROIDISM: ICD-10-CM

## 2024-10-30 DIAGNOSIS — O16.3 ELEVATED BLOOD PRESSURE AFFECTING PREGNANCY IN THIRD TRIMESTER, ANTEPARTUM: Primary | ICD-10-CM

## 2024-10-30 LAB
GLUCOSE UR STRIP-MCNC: NEGATIVE MG/DL
LEUKOCYTE EST, POC: NEGATIVE
NITRITE UR-MCNC: NEGATIVE MG/ML
PROT UR STRIP-MCNC: ABNORMAL MG/DL

## 2024-10-30 RX ORDER — PRENATAL VIT NO.126/IRON/FOLIC 28MG-0.8MG
TABLET ORAL DAILY
COMMUNITY

## 2024-10-30 NOTE — PROGRESS NOTES
Chief Complaint   Patient presents with    Routine Prenatal Visit     No c/c / declines tdap and flu       Sharon Delgadillo is a 29 y.o.  at 29w2d who presents for follow up prenatal care. Overall, she is feeling well. She denies vaginal bleeding, leakage of fluid or contractions. Baby is moving. She has no complaints today. Denies headache, chest pain, shortness of breath. She has been taking her BP at work and reports all normal.     Pregnancy is complicated by:  - Hypothyroidism, on Synthroid - recently increased dose 10/19  - Hx MR BP x 1 at 20 weeks, no history of chronic HTN but does report that BP has been elevated in the past after night shifts   - First child with clubbed foot         /88   Wt 109 kg (241 lb)   BMI 37.74 kg/m²    General: No acute distress, sitting comfortably   CV: Regular heart rate  Lungs: Breathing unlabored  Abdomen: Soft, nontender, gravid,S=D   Pelvic: deferred      ASSESSMENT/PLAN    Routine prenatal care   - The problem list for pregnancy was updated today  - Prenatal labs: In care everywhere.   Urine culture pos on initial visit with neg CARLTON    A pos ab neg, CBC wnl, HIV neg Rubella immune VZV immune Hep B neg Hep C neg   - Testing for GC / Chlamydia / trichomonas neg 10/19   - Genetic testing reviewed:  declines  - Prenatal anatomy ultrasound seen in care everywhere - normal Level 2    Growth US normal 10/19   - 28 weeks: GCT, H/H, RPR testing - normal. Plans to do Tdap at work   - 36 weeks: GBS testing   - Recommend prenatal vitamins   - Discussed vaccine recommendations for influenza and COVID at any gestational age -  plans to do at work   - Discussed RSV vaccination 32-36w from Sept -       2. Hypothyroidism   - on Synthroid 112 mcg   - Repeat labs monthly (next due ~)    3. Elevated BP without diagnosis of HTN   Suspect patient may have undiagnosed CHTN given elevated BP at 20 weeks and reported occasional elevated BP after night shifts.    - PE panel  and UPCR ordered today    - Not currently on any medication for BP or ASA. Given late gestational age, I do not anticipate benefit from starting ASA.    - Recommend patient take BP daily and call if BP > 150/100.     Follow up in 2 weeks.     This note was electronically signed.    Lori Chaudhari MD  Obstetrics and Gynecology  [facility]

## 2024-11-07 ENCOUNTER — TELEPHONE (OUTPATIENT)
Dept: OBSTETRICS AND GYNECOLOGY | Facility: CLINIC | Age: 29
End: 2024-11-07
Payer: COMMERCIAL

## 2024-11-07 NOTE — TELEPHONE ENCOUNTER
Patient should get treatment for strep. She should come in for decreased fetal movement, contractions, pain not relieved by Tylenol.     Tylenol can help with fever.     Lori Chaudhari MD  Obstetrics and Gynecology  [facility]

## 2024-11-07 NOTE — TELEPHONE ENCOUNTER
TINY    Pt is 30w 3d she called stating that for the past 2 days she's been running a fever. Pt states that she tested positive for strep, but has not started any medication for it. She states that her belly doesn't feel like it does on a daily basis. There is an increase in baby movement, but its really uncomfortable. Pt is concerned and would like to know what she needs to do. Please advise. Thank you

## 2024-11-07 NOTE — TELEPHONE ENCOUNTER
Pt advised that she only needs to come in for decreased fetal movement, fluid leakage, bleeding or contractions. She needs to call whoever told her she has strep for medications. Increased movement is great. She can take 1,000mg tylenol every 8 hours not to exceed 3,000 in 24 hours for pain and discomfort.     Pt states that she has previously become dehydrated from not drinking enough fluids in the past pregnancy and she would have contractions. She is drinking well and has good urine output. She will call the office if she is starting to become dehydrated.

## 2024-11-08 ENCOUNTER — HOSPITAL ENCOUNTER (OUTPATIENT)
Facility: HOSPITAL | Age: 29
Discharge: HOME | End: 2024-11-08
Attending: STUDENT IN AN ORGANIZED HEALTH CARE EDUCATION/TRAINING PROGRAM | Admitting: STUDENT IN AN ORGANIZED HEALTH CARE EDUCATION/TRAINING PROGRAM
Payer: COMMERCIAL

## 2024-11-08 VITALS
WEIGHT: 240 LBS | SYSTOLIC BLOOD PRESSURE: 127 MMHG | HEART RATE: 113 BPM | HEIGHT: 67 IN | TEMPERATURE: 98.7 F | DIASTOLIC BLOOD PRESSURE: 77 MMHG | OXYGEN SATURATION: 97 % | BODY MASS INDEX: 37.67 KG/M2 | RESPIRATION RATE: 18 BRPM

## 2024-11-08 LAB
ALBUMIN SERPL-MCNC: 3.7 G/DL (ref 3.5–5.2)
ALBUMIN/GLOB SERPL: 1 G/DL
ALP SERPL-CCNC: 112 U/L (ref 39–117)
ALT SERPL W P-5'-P-CCNC: 13 U/L (ref 1–33)
ANION GAP SERPL CALCULATED.3IONS-SCNC: 14 MMOL/L (ref 5–15)
AST SERPL-CCNC: 22 U/L (ref 1–32)
BACTERIA UR QL AUTO: ABNORMAL /HPF
BASOPHILS # BLD MANUAL: 0 10*3/MM3 (ref 0–0.2)
BASOPHILS NFR BLD MANUAL: 0 % (ref 0–1.5)
BILIRUB SERPL-MCNC: 0.5 MG/DL (ref 0–1.2)
BILIRUB UR QL STRIP: NEGATIVE
BUN SERPL-MCNC: 6 MG/DL (ref 6–20)
BUN/CREAT SERPL: 8.8 (ref 7–25)
CALCIUM SPEC-SCNC: 8.1 MG/DL (ref 8.6–10.5)
CHLORIDE SERPL-SCNC: 98 MMOL/L (ref 98–107)
CLARITY UR: ABNORMAL
CO2 SERPL-SCNC: 21 MMOL/L (ref 22–29)
COLOR UR: YELLOW
CREAT SERPL-MCNC: 0.68 MG/DL (ref 0.57–1)
DEPRECATED RDW RBC AUTO: 42.7 FL (ref 37–54)
EGFRCR SERPLBLD CKD-EPI 2021: 122.6 ML/MIN/1.73
EOSINOPHIL # BLD MANUAL: 0.06 10*3/MM3 (ref 0–0.4)
EOSINOPHIL NFR BLD MANUAL: 1 % (ref 0.3–6.2)
ERYTHROCYTE [DISTWIDTH] IN BLOOD BY AUTOMATED COUNT: 12.6 % (ref 12.3–15.4)
GLOBULIN UR ELPH-MCNC: 3.7 GM/DL
GLUCOSE SERPL-MCNC: 108 MG/DL (ref 65–99)
GLUCOSE UR STRIP-MCNC: NEGATIVE MG/DL
HCT VFR BLD AUTO: 39.2 % (ref 34–46.6)
HGB BLD-MCNC: 13 G/DL (ref 12–15.9)
HGB UR QL STRIP.AUTO: NEGATIVE
HYALINE CASTS UR QL AUTO: ABNORMAL /LPF
KETONES UR QL STRIP: ABNORMAL
LEUKOCYTE ESTERASE UR QL STRIP.AUTO: ABNORMAL
LYMPHOCYTES # BLD MANUAL: 0.76 10*3/MM3 (ref 0.7–3.1)
LYMPHOCYTES NFR BLD MANUAL: 4 % (ref 5–12)
MCH RBC QN AUTO: 30.7 PG (ref 26.6–33)
MCHC RBC AUTO-ENTMCNC: 33.2 G/DL (ref 31.5–35.7)
MCV RBC AUTO: 92.7 FL (ref 79–97)
MONOCYTES # BLD: 0.25 10*3/MM3 (ref 0.1–0.9)
NEUTROPHILS # BLD AUTO: 5.27 10*3/MM3 (ref 1.7–7)
NEUTROPHILS NFR BLD MANUAL: 62 % (ref 42.7–76)
NEUTS BAND NFR BLD MANUAL: 21 % (ref 0–5)
NITRITE UR QL STRIP: NEGATIVE
PH UR STRIP.AUTO: 6 [PH] (ref 5–8)
PLAT MORPH BLD: NORMAL
PLATELET # BLD AUTO: 230 10*3/MM3 (ref 140–450)
PMV BLD AUTO: 9.6 FL (ref 6–12)
POTASSIUM SERPL-SCNC: 3.5 MMOL/L (ref 3.5–5.2)
PROT SERPL-MCNC: 7.4 G/DL (ref 6–8.5)
PROT UR QL STRIP: NEGATIVE
RBC # BLD AUTO: 4.23 10*6/MM3 (ref 3.77–5.28)
RBC # UR STRIP: ABNORMAL /HPF
RBC MORPH BLD: NORMAL
REF LAB TEST METHOD: ABNORMAL
SODIUM SERPL-SCNC: 133 MMOL/L (ref 136–145)
SP GR UR STRIP: 1.01 (ref 1–1.03)
SQUAMOUS #/AREA URNS HPF: ABNORMAL /HPF
UROBILINOGEN UR QL STRIP: ABNORMAL
VARIANT LYMPHS NFR BLD MANUAL: 1 % (ref 0–5)
VARIANT LYMPHS NFR BLD MANUAL: 11 % (ref 19.6–45.3)
WBC # UR STRIP: ABNORMAL /HPF
WBC MORPH BLD: NORMAL
WBC NRBC COR # BLD AUTO: 6.35 10*3/MM3 (ref 3.4–10.8)

## 2024-11-08 PROCEDURE — 80053 COMPREHEN METABOLIC PANEL: CPT | Performed by: OBSTETRICS & GYNECOLOGY

## 2024-11-08 PROCEDURE — 63710000001 ONDANSETRON ODT 4 MG TABLET DISPERSIBLE: Performed by: OBSTETRICS & GYNECOLOGY

## 2024-11-08 PROCEDURE — 59025 FETAL NON-STRESS TEST: CPT | Performed by: OBSTETRICS & GYNECOLOGY

## 2024-11-08 PROCEDURE — 99213 OFFICE O/P EST LOW 20 MIN: CPT | Performed by: OBSTETRICS & GYNECOLOGY

## 2024-11-08 PROCEDURE — 85007 BL SMEAR W/DIFF WBC COUNT: CPT | Performed by: OBSTETRICS & GYNECOLOGY

## 2024-11-08 PROCEDURE — 59025 FETAL NON-STRESS TEST: CPT

## 2024-11-08 PROCEDURE — 36415 COLL VENOUS BLD VENIPUNCTURE: CPT | Performed by: OBSTETRICS & GYNECOLOGY

## 2024-11-08 PROCEDURE — 81001 URINALYSIS AUTO W/SCOPE: CPT | Performed by: OBSTETRICS & GYNECOLOGY

## 2024-11-08 PROCEDURE — G0463 HOSPITAL OUTPT CLINIC VISIT: HCPCS

## 2024-11-08 PROCEDURE — 85027 COMPLETE CBC AUTOMATED: CPT | Performed by: OBSTETRICS & GYNECOLOGY

## 2024-11-08 RX ORDER — ONDANSETRON 4 MG/1
4 TABLET, ORALLY DISINTEGRATING ORAL ONCE
Status: COMPLETED | OUTPATIENT
Start: 2024-11-08 | End: 2024-11-08

## 2024-11-08 RX ORDER — ONDANSETRON 4 MG/1
4 TABLET, ORALLY DISINTEGRATING ORAL EVERY 8 HOURS PRN
Qty: 27 TABLET | Refills: 1 | Status: SHIPPED | OUTPATIENT
Start: 2024-11-08

## 2024-11-08 RX ADMIN — ONDANSETRON 4 MG: 4 TABLET, ORALLY DISINTEGRATING ORAL at 03:49

## 2024-11-08 NOTE — H&P
"[facility]  Obstetric History and Physical    Chief Complaint   Patient presents with    Vomiting     States that 3 year old brought home stoamch bug an also is known to have strep. States can't keep down fluids.      HPI:      Patient is a 29 y.o. female  currently at 30w4d, who presents with a complaint of N/V/F/C that she has had over the day or so.  Her 3 year old brought home a \"stomach\" bug, but she was diagnosed with strep throat. She has a Rx for amoxicillin, but has not picked it up yet.  Her fever has been controlled with Tylenol.   She feels like she has not been able to keep anything down other than applesauce.  She currently is afebrile.   She took PO Zofran here which helped significantly.   No regnancy related concerns including vaginal leaking and bleeding.   +FM.   No contractions         The following portions of the patients history were reviewed and updated as appropriate: current medications, allergies, past medical history, past surgical history, past family history, past social history and problem list .       Prenatal Information:   Maternal Prenatal Labs  Blood Type No results found for: \"ABO\"   Rh Status No results found for: \"RH\"   Antibody Screen No results found for: \"ABSCRN\"   Gonnorhea No results found for: \"GCCX\"   Chlamydia No results found for: \"CLAMYDCU\"   RPR No results found for: \"RPR\"   Syphilis Antibody No results found for: \"SYPHILIS\"   Rubella No results found for: \"RUBELLAIGGIN\"   Hepatitis B Surface Antigen No results found for: \"HEPBSAG\"   HIV-1 Antibody No results found for: \"LABHIV1\"   Hepatitis C Antibody No results found for: \"HEPCAB\"   Rapid Urin Drug Screen No results found for: \"AMPMETHU\", \"BARBITSCNUR\", \"LABBENZSCN\", \"LABMETHSCN\", \"LABOPIASCN\", \"THCURSCR\", \"COCAINEUR\", \"AMPHETSCREEN\", \"PROPOXSCN\", \"BUPRENORSCNU\", \"METAMPSCNUR\", \"OXYCODONESCN\", \"TRICYCLICSCN\"   Group B Strep Culture No results found for: \"GBSANTIGEN\"           External Prenatal Results  "      Pregnancy Outside Results - Transcribed From Office Records - See Scanned Records For Details       Test Value Date Time    ABO  A  10/19/24 1159    Rh  Positive  10/19/24 1159    Antibody Screen  Negative  10/19/24 1159    Varicella IgG       Rubella       Hgb  13.0 g/dL 24 0358       12.8 g/dL 10/19/24 1808    Hct  39.2 % 24 0358       37.5 % 10/19/24 1808    HgB A1c        1h GTT  133 mg/dL 10/19/24 1152    3h GTT Fasting       3h GTT 1 hour       3h GTT 2 hour       3h GTT 3 hour        Gonorrhea (discrete)  Negative  10/15/24 1550    Chlamydia (discrete)  Negative  10/15/24 1550    RPR  Non-Reactive  10/19/24 1807    Syphils cascade: TP-Ab (FTA)       TP-Ab       TP-Ab (EIA)       TPPA       HBsAg       Herpes Simplex Virus PCR       Herpes Simplex VIrus Culture       HIV       Hep C RNA Quant PCR       Hep C Antibody       AFP       NIPT       Cystic Fibroisis        Group B Strep       GBS Susceptibility to Clindamycin       GBS Susceptibility to Erythromycin       Fetal Fibronectin       Genetic Testing, Maternal Blood                 Drug Screening       Test Value Date Time    Urine Drug Screen       Amphetamine Screen  Negative  10/15/24 1550    Barbiturate Screen  Negative  10/15/24 1550    Benzodiazepine Screen  Negative  10/15/24 1550    Methadone Screen  Negative  10/15/24 1550    Phencyclidine Screen  Negative  10/15/24 1550    Opiates Screen  Negative  10/15/24 1550    THC Screen  Negative  10/15/24 1550    Cocaine Screen       Propoxyphene Screen       Buprenorphine Screen  Negative  10/15/24 1550    Methamphetamine Screen       Oxycodone Screen  Negative  10/15/24 1550    Tricyclic Antidepressants Screen  Negative  10/15/24 1550              Legend    ^: Historical                              Past OB History:     OB History    Para Term  AB Living   2 1 1 0 0 1   SAB IAB Ectopic Molar Multiple Live Births   0 0 0 0 0 1      # Outcome Date GA Lbr Jordan/2nd Weight Sex  Type Anes PTL Lv   2 Current            1 Term 10/02/21 39w4d  3490 g (7 lb 11.1 oz) M Vag-Spont EPI N SHYANNE      Name: JENNA VICTORIA      Apgar1: 9  Apgar5: 9       Past Medical History: Past Medical History:   Diagnosis Date    ADHD (attention deficit hyperactivity disorder) 2017    Depression 2021    Glaucoma 2015    Monitored yearly    Hyperlipidemia     resolved    Hypothyroidism     Obesity 2021    Noted after giving birth    Ovarian cyst     Urinary tract infection 2024      Past Surgical History Past Surgical History:   Procedure Laterality Date    KNEE ACL RECONSTRUCTION  06/2014      Family History: Family History   Problem Relation Age of Onset    Thyroid disease Mother     Depression Mother     Drug abuse Mother         Opioid abuse (perscribed only)    Mental illness Mother         Depression and bipolar    Drug abuse Sister         Opiod abuse leading to street drug use. DOC  herion and meth    Early death Sister         Cause of death never found. Autopsy was not done. Only info is drugs were not in her system. Had history of drug related heart problems that were uncontrolled.    Mental illness Sister         Depression and bipolar    Osteoporosis Paternal Grandmother     Vision loss Paternal Grandmother         Glaucoma    Diabetes Maternal Grandmother     Hyperlipidemia Maternal Grandmother         Extremely obese    Thyroid disease Maternal Grandmother     Breast cancer Neg Hx     Ovarian cancer Neg Hx     Uterine cancer Neg Hx     Colon cancer Neg Hx       Social History:  reports that she has never smoked. She has never been exposed to tobacco smoke. She has never used smokeless tobacco.   reports that she does not currently use alcohol.   reports no history of drug use.            Objective     Vital Signs Range for the last 24 hours  Temperature: Temp:  [98.7 °F (37.1 °C)] 98.7 °F (37.1 °C)   Temp Source: Temp src: Oral   BP: BP: (127)/(77) 127/77   Pulse: Heart Rate:  [113] 113    Respirations: Resp:  [18] 18   SPO2: SpO2:  [97 %] 97 %   O2 Amount (l/min):     O2 Devices Device (Oxygen Therapy): room air   Weight: Weight:  [109 kg (240 lb)] 109 kg (240 lb)     Physical Examination: General appearance - alert, well appearing, and in no distress and oriented to person, place, and time  Chest - Breathing is unlaboured   Heart - Slight tachycardia rate.   Abdomen - soft, nontender, nondistended,   no rebound tenderness noted  No guarding, No RUQ pain  Extremities - pedal edema  - none       Fetal Heart Rate Assessment   Method:     Beats/min:     Baseline:  140s-150s   Varibility:  mod   Accels:  y   Decels:  n   Tracing Category:  1     Uterine Assessment   Method:     Frequency (min):  None    Ctx Count in 10 min:     Duration:     Intensity:     Intensity by IUPC:     Resting Tone:     Resting Tone by IUPC:     [place] Units:      NST                     Indication: Nausea and vomiting   Start time: 0400                 End time: 0445  15 x 15 accels x 2  Yes  No decels  Baseline   140s-150s  Reactive NST - Yes     Laboratory Results:   Lab Results   Component Value Date     11/08/2024    HGB 13.0 11/08/2024    HCT 39.2 11/08/2024    WBC 6.35 11/08/2024      Lab Results   Component Value Date     (L) 11/08/2024    K 3.5 11/08/2024    CL 98 11/08/2024    CO2 21.0 (L) 11/08/2024    BUN 6 11/08/2024    CREATININE 0.68 11/08/2024    GLUCOSE 108 (H) 11/08/2024    ALBUMIN 3.7 11/08/2024    CALCIUM 8.1 (L) 11/08/2024    AST 22 11/08/2024    ALT 13 11/08/2024    BILITOT 0.5 11/08/2024      Lab Results   Component Value Date    SQUAMEPIUA 13-20 (A) 11/08/2024    SPECGRAVUR 1.012 11/08/2024    KETONESU 15 mg/dL (1+) (A) 11/08/2024    BLOODU Negative 11/08/2024    LEUKOCYTESUR Small (1+) (A) 11/08/2024    NITRITEU Negative 11/08/2024    RBCUA 0-2 11/08/2024    WBCUA 6-10 (A) 11/08/2024    BACTERIA Trace 11/08/2024            Assessment/Plan  1. Intrauterine pregnancy at 30w4d weeks  gestation with reactive fetal status  2. Nausea and vomiting - Diagnosed with Strep Throat and has existing Rx to be picked up.  Felt much better with Zofran - Rx sent to her pharmacy for Zofran  3.  Reviewed S/S for her to be aware of for her to return for further evaluation and management   4.  Given education and reassurance   5.  Questions answered.  6.  D/C home.         Shade To MD  11/8/2024  04:54 EST

## 2024-11-14 ENCOUNTER — ROUTINE PRENATAL (OUTPATIENT)
Dept: OBSTETRICS AND GYNECOLOGY | Facility: CLINIC | Age: 29
End: 2024-11-14
Payer: COMMERCIAL

## 2024-11-14 VITALS — BODY MASS INDEX: 37.12 KG/M2 | SYSTOLIC BLOOD PRESSURE: 122 MMHG | WEIGHT: 237 LBS | DIASTOLIC BLOOD PRESSURE: 84 MMHG

## 2024-11-14 DIAGNOSIS — O09.93 SUPERVISION OF HIGH RISK PREGNANCY IN THIRD TRIMESTER: ICD-10-CM

## 2024-11-14 DIAGNOSIS — O16.3 ELEVATED BLOOD PRESSURE AFFECTING PREGNANCY IN THIRD TRIMESTER, ANTEPARTUM: ICD-10-CM

## 2024-11-14 DIAGNOSIS — E03.9 ACQUIRED HYPOTHYROIDISM: Primary | ICD-10-CM

## 2024-11-14 NOTE — PROGRESS NOTES
Chief Complaint   Patient presents with    Routine Prenatal Visit     No c/c       Sharon Delgadillo is a 29 y.o.  at 31w3d who presents for follow up prenatal care. Overall, she is feeling well. She denies vaginal bleeding, leakage of fluid or contractions. Baby is moving. She has no complaints today. Denies headache, chest pain, shortness of breath. She has been taking her BP at work and reports all normal. She was recently seen in triage for nausea, which improved with Zofran. She also recently had strep throat.     Pregnancy is complicated by:  - Hypothyroidism, on Synthroid - recently increased dose 10/19  - Hx MR BP x 1 at 20 weeks, no history of chronic HTN but does report that BP has been elevated in the past after night shifts   - First child with clubbed foot     /84   Wt 108 kg (237 lb)   BMI 37.12 kg/m²    General: No acute distress, sitting comfortably   CV: Regular heart rate  Lungs: Breathing unlabored  Abdomen: Soft, nontender, gravid,S=D   Pelvic: deferred      ASSESSMENT/PLAN    Routine prenatal care   - The problem list for pregnancy was updated today  - Prenatal labs: In care everywhere.   Urine culture pos on initial visit with neg CARLTON    A pos ab neg, CBC wnl, HIV neg Rubella immune VZV immune Hep B neg Hep C neg   - Testing for GC / Chlamydia / trichomonas neg 10/19   - Genetic testing reviewed:  declines  - Prenatal anatomy ultrasound seen in care everywhere - normal Level 2    Growth US normal 10/19   - 28 weeks: GCT, H/H, RPR testing - normal. Plans to do Tdap at work   - 36 weeks: GBS testing   - Recommend prenatal vitamins   - Discussed vaccine recommendations for influenza and COVID at any gestational age -  plans to do flu at work, declines COVID  - Discussed RSV vaccination 32-36w from Sept -       2. Hypothyroidism   - on Synthroid 112 mcg   - Repeat labs monthly (next due ~)    3. Elevated BP without diagnosis of HTN   Suspect patient may have undiagnosed CHTN  given elevated BP at 20 weeks and reported occasional elevated BP after night shifts.    - PE panel and UPCR ordered; patient needs to complete    - Not currently on any medication for BP or ASA. Given late gestational age, I do not anticipate benefit from starting ASA.    - Recommend patient take BP daily and call if BP > 150/100.     Follow up in 2 weeks.     This note was electronically signed.    Lori Chaudhari MD  Obstetrics and Gynecology  [facility]

## 2024-11-25 ENCOUNTER — ROUTINE PRENATAL (OUTPATIENT)
Dept: OBSTETRICS AND GYNECOLOGY | Facility: CLINIC | Age: 29
End: 2024-11-25
Payer: COMMERCIAL

## 2024-11-25 VITALS — SYSTOLIC BLOOD PRESSURE: 130 MMHG | WEIGHT: 239.6 LBS | BODY MASS INDEX: 37.53 KG/M2 | DIASTOLIC BLOOD PRESSURE: 84 MMHG

## 2024-11-25 DIAGNOSIS — E03.9 ACQUIRED HYPOTHYROIDISM: Primary | ICD-10-CM

## 2024-11-25 DIAGNOSIS — O16.3 ELEVATED BLOOD PRESSURE AFFECTING PREGNANCY IN THIRD TRIMESTER, ANTEPARTUM: ICD-10-CM

## 2024-11-25 LAB
GLUCOSE UR STRIP-MCNC: ABNORMAL MG/DL
LEUKOCYTE EST, POC: NEGATIVE
NITRITE UR-MCNC: NEGATIVE MG/ML
PROT UR STRIP-MCNC: ABNORMAL MG/DL

## 2024-11-25 PROCEDURE — 0502F SUBSEQUENT PRENATAL CARE: CPT | Performed by: STUDENT IN AN ORGANIZED HEALTH CARE EDUCATION/TRAINING PROGRAM

## 2024-11-25 NOTE — PROGRESS NOTES
Chief Complaint   Patient presents with    Routine Prenatal Visit     No c/c       Sharon Delgadillo is a 29 y.o.  at 33w0d who presents for follow up prenatal care. Overall, she is feeling well. She denies vaginal bleeding, leakage of fluid or contractions. Baby is moving. She has no complaints today. Denies headache, chest pain, shortness of breath. She has been taking her BP at work and reports all normal (125-130/70-80). Has gotten over strep throat, but then had toothache-- has seen dentist but no plans to remove tooth until after pregnancy.      Pregnancy is complicated by:  - Hypothyroidism, on Synthroid - recently increased dose 10/19  - Hx MR BP x 1 at 20 weeks, no history of chronic HTN but does report that BP has been elevated in the past after night shifts   - First child with clubbed foot     /84   Wt 109 kg (239 lb 9.6 oz)   BMI 37.53 kg/m²    General: No acute distress, sitting comfortably   CV: Regular heart rate  Lungs: Breathing unlabored  Abdomen: Soft, nontender, gravid,S=D   Pelvic: deferred      ASSESSMENT/PLAN    Routine prenatal care   - The problem list for pregnancy was updated today  - Prenatal labs: In care everywhere.   Urine culture pos on initial visit with neg CARLTON    A pos ab neg, CBC wnl, HIV neg Rubella immune VZV immune Hep B neg Hep C neg   - Testing for GC / Chlamydia / trichomonas neg 10/19   - Genetic testing reviewed:  declines  - Prenatal anatomy ultrasound seen in care everywhere - normal Level 2    Growth US normal 10/19   - 28 weeks: GCT, H/H, RPR testing - normal. Did Tdap at work  - 36 weeks: GBS testing   - Recommend prenatal vitamins   - Discussed vaccine recommendations for influenza and COVID at any gestational age - declines Flu and COVID  - Discussed RSV vaccination 32-36w from Sept -  - declines     2. Hypothyroidism   - on Synthroid 112 mcg   - Repeat labs monthly (will do today)    3. Elevated BP without diagnosis of HTN   Suspect patient may  have undiagnosed CHTN given elevated BP at 20 weeks and reported occasional elevated BP after night shifts.    - PE panel and UPCR ordered again today     - Not currently on any medication for BP or ASA. Given late gestational age, I do not anticipate benefit from starting ASA.    - Recommend patient take BP daily and call if BP > 150/100.    - Will plan for growth US at next visit given history of elevated BP x 1 and hypothyroidism     Follow up in 2 weeks with growth US.     This note was electronically signed.    Lori Chaudhari MD  Obstetrics and Gynecology  [facility]

## 2024-12-06 ENCOUNTER — LAB (OUTPATIENT)
Dept: LAB | Facility: HOSPITAL | Age: 29
End: 2024-12-06
Payer: COMMERCIAL

## 2024-12-06 DIAGNOSIS — E03.9 ACQUIRED HYPOTHYROIDISM: ICD-10-CM

## 2024-12-06 DIAGNOSIS — O16.3 ELEVATED BLOOD PRESSURE AFFECTING PREGNANCY IN THIRD TRIMESTER, ANTEPARTUM: ICD-10-CM

## 2024-12-06 LAB
ALP SERPL-CCNC: 146 U/L (ref 39–117)
ALT SERPL W P-5'-P-CCNC: 10 U/L (ref 1–33)
AST SERPL-CCNC: 12 U/L (ref 1–32)
BILIRUB SERPL-MCNC: 0.2 MG/DL (ref 0–1.2)
CREAT SERPL-MCNC: 0.61 MG/DL (ref 0.57–1)
CREAT UR-MCNC: 196.9 MG/DL
LDH SERPL-CCNC: 189 U/L (ref 135–214)
PROT ?TM UR-MCNC: 43.6 MG/DL
PROT/CREAT UR: 221.4 MG/G CREA (ref 0–200)
T4 FREE SERPL-MCNC: 0.83 NG/DL (ref 0.92–1.68)
TSH SERPL DL<=0.05 MIU/L-ACNC: 5.21 UIU/ML (ref 0.27–4.2)
URATE SERPL-MCNC: 3.5 MG/DL (ref 2.4–5.7)

## 2024-12-06 PROCEDURE — 83615 LACTATE (LD) (LDH) ENZYME: CPT

## 2024-12-06 PROCEDURE — 82570 ASSAY OF URINE CREATININE: CPT

## 2024-12-06 PROCEDURE — 84443 ASSAY THYROID STIM HORMONE: CPT

## 2024-12-06 PROCEDURE — 84460 ALANINE AMINO (ALT) (SGPT): CPT

## 2024-12-06 PROCEDURE — 82565 ASSAY OF CREATININE: CPT

## 2024-12-06 PROCEDURE — 84439 ASSAY OF FREE THYROXINE: CPT

## 2024-12-06 PROCEDURE — 84156 ASSAY OF PROTEIN URINE: CPT

## 2024-12-06 PROCEDURE — 36415 COLL VENOUS BLD VENIPUNCTURE: CPT

## 2024-12-06 PROCEDURE — 84075 ASSAY ALKALINE PHOSPHATASE: CPT

## 2024-12-06 PROCEDURE — 84450 TRANSFERASE (AST) (SGOT): CPT

## 2024-12-06 PROCEDURE — 82247 BILIRUBIN TOTAL: CPT

## 2024-12-06 PROCEDURE — 84550 ASSAY OF BLOOD/URIC ACID: CPT

## 2024-12-09 ENCOUNTER — ROUTINE PRENATAL (OUTPATIENT)
Dept: OBSTETRICS AND GYNECOLOGY | Facility: CLINIC | Age: 29
End: 2024-12-09
Payer: COMMERCIAL

## 2024-12-09 VITALS — DIASTOLIC BLOOD PRESSURE: 82 MMHG | BODY MASS INDEX: 37.43 KG/M2 | SYSTOLIC BLOOD PRESSURE: 124 MMHG | WEIGHT: 239 LBS

## 2024-12-09 DIAGNOSIS — E03.9 ACQUIRED HYPOTHYROIDISM: Primary | ICD-10-CM

## 2024-12-09 DIAGNOSIS — O09.93 SUPERVISION OF HIGH RISK PREGNANCY IN THIRD TRIMESTER: ICD-10-CM

## 2024-12-09 DIAGNOSIS — O16.3 ELEVATED BLOOD PRESSURE AFFECTING PREGNANCY IN THIRD TRIMESTER, ANTEPARTUM: ICD-10-CM

## 2024-12-09 LAB
GLUCOSE UR STRIP-MCNC: NEGATIVE MG/DL
LEUKOCYTE EST, POC: NEGATIVE
PROT UR STRIP-MCNC: ABNORMAL MG/DL
UROBILINOGEN UR QL: NORMAL

## 2024-12-09 PROCEDURE — 0502F SUBSEQUENT PRENATAL CARE: CPT | Performed by: STUDENT IN AN ORGANIZED HEALTH CARE EDUCATION/TRAINING PROGRAM

## 2024-12-09 NOTE — LETTER
To whom it may concern:    Sharon Delgadillo is currently pregnant and due to deliver on 1/13/2024.     Dental care is safe in pregnancy, including dental X-rays with shielding of abdomen and thyroid and local anesthesia with lidocaine. Extractions, root canals and restoration of untreated caries can be performed at any time in pregnancy.     If needed, antibiotics that are safe in pregnancy include:   - Penicillins such as amoxicillin    - Cephalosporins    - Vancomycin    - Azithryomycin    - Clindamycin    - Metronidazole   - Nitrofurantoin       Please call our office with any questions.                 Lori Chaudhari MD  Obstetrics and Gynecology  [facility]

## 2024-12-09 NOTE — PROGRESS NOTES
Chief Complaint   Patient presents with    Routine Prenatal Visit     US done today / abscess in tooth (thinks dentist is waiting on us to prescribe something)       Sharon Delgadillo is a 29 y.o.  at 35w0d who presents for follow up prenatal care. Overall, she is feeling well. She denies vaginal bleeding, leakage of fluid or contractions. Baby is moving. She has no complaints today. Denies headache, chest pain, shortness of breath. She has been taking her BP and reports all normal (125-130/70-85).     She continues to have toothache but dental care has been limited -- dentist does not want to treat in pregnancy per patient.      Pregnancy is complicated by:  - Hypothyroidism, on Synthroid - increased based on labs last visit   - Hx MR BP x 1 at 20 weeks, no history of chronic HTN but does report that BP has been elevated in the past after night shifts   - First child with clubbed foot     /82   Wt 108 kg (239 lb)   BMI 37.43 kg/m²    General: No acute distress, sitting comfortably   CV: Regular heart rate  Lungs: Breathing unlabored  Abdomen: Soft, nontender, gravid,S=D   Pelvic: deferred      ASSESSMENT/PLAN    Routine prenatal care   - The problem list for pregnancy was updated today  - Prenatal labs: In care everywhere.   Urine culture pos on initial visit with neg CARLTON    A pos ab neg, CBC wnl, HIV neg Rubella immune VZV immune Hep B neg Hep C neg   - Testing for GC / Chlamydia / trichomonas neg 10/19   - Genetic testing reviewed:  declines  - Prenatal anatomy ultrasound seen in care everywhere - normal Level 2    Growth US normal 10/19   - 28 weeks: GCT, H/H, RPR testing - normal. Did Tdap at work  - 36 weeks: GBS testing   - Recommend prenatal vitamins   - Discussed vaccine recommendations for influenza and COVID at any gestational age - declines  - Discussed RSV vaccination 32-36w from Sept -  - declines     2. Hypothyroidism    - on Synthroid 125 mcg   - Repeat labs monthly (last done  )    3. Elevated BP x 1 without diagnosis of HTN   Suspect patient may have undiagnosed CHTN given elevated BP at 20 weeks, but no further elevated BP   - PE panel with mild alk phos elevation, otherwise normal. UPCR 221    - Not currently on any medication for BP or ASA. Given late gestational age, I do not anticipate benefit from starting ASA.    - Recommend patient take BP daily and call if BP > 150/100.    - Growth US done today   - If one more elevated BP, will plan to start  testing.   - IOL scheduled at 39 weeks.     4. Toothache   - Dental clearance letter provided today     Follow up in 1 week.    This note was electronically signed.    Lori Chaudhari MD  Obstetrics and Gynecology  [facility]

## 2024-12-16 ENCOUNTER — ROUTINE PRENATAL (OUTPATIENT)
Dept: OBSTETRICS AND GYNECOLOGY | Facility: CLINIC | Age: 29
End: 2024-12-16
Payer: COMMERCIAL

## 2024-12-16 VITALS — SYSTOLIC BLOOD PRESSURE: 138 MMHG | DIASTOLIC BLOOD PRESSURE: 78 MMHG | BODY MASS INDEX: 38.12 KG/M2 | WEIGHT: 243.4 LBS

## 2024-12-16 DIAGNOSIS — O09.93 SUPERVISION OF HIGH RISK PREGNANCY IN THIRD TRIMESTER: Primary | ICD-10-CM

## 2024-12-16 DIAGNOSIS — E03.9 ACQUIRED HYPOTHYROIDISM: ICD-10-CM

## 2024-12-16 LAB
GLUCOSE UR STRIP-MCNC: NEGATIVE MG/DL
GP B STREP RRNA SPEC QL PROBE: NORMAL
LEUKOCYTE EST, POC: NEGATIVE
NITRITE UR-MCNC: NEGATIVE MG/ML
PROT UR STRIP-MCNC: ABNORMAL MG/DL

## 2024-12-16 RX ORDER — AMOXICILLIN 500 MG/1
500 CAPSULE ORAL 2 TIMES DAILY
COMMUNITY

## 2024-12-16 NOTE — LETTER
December 16, 2024     Patient: Sharon Delgadillo   YOB: 1995   Date of Visit: 12/16/2024       To Whom It May Concern:    It is my medical opinion that Sharon Delgadillo may return to light duty immediately with the following restrictions: No heavy lifting (>25 pounds), needs to have a 15 minute break from standing if standing for 2 hours and regular access to food and water .           Sincerely,        Lori Chaudhari MD    CC: No Recipients

## 2024-12-16 NOTE — PROGRESS NOTES
Chief Complaint   Patient presents with    Routine Prenatal Visit     On amoxicillin for abscess tooth       Sharon Delgadillo is a 29 y.o.  at 35w0d who presents for follow up prenatal care. Overall, she is feeling well. She denies vaginal bleeding, leakage of fluid or contractions. Baby is moving. She has no complaints today. Denies headache, chest pain, shortness of breath. She has been taking her BP and reports all normal (125-130/70-85).     She got a new dentist! This new dentist prescribed her antibiotics and they plan for tooth removal postpartum.       Pregnancy is complicated by:  - Hypothyroidism, on Synthroid - increased based on labs   - Hx MR BP x 1 at 20 weeks, no history of chronic HTN but does report that BP has been elevated in the past after night shifts   - First child with clubbed foot     /78   Wt 110 kg (243 lb 6.4 oz)   BMI 38.12 kg/m²    General: No acute distress, sitting comfortably   CV: Regular heart rate  Lungs: Breathing unlabored  Abdomen: Soft, nontender, gravid,S=D   Pelvic: /-3      ASSESSMENT/PLAN    Routine prenatal care   - The problem list for pregnancy was updated today  - Prenatal labs: In care everywhere.   Urine culture pos on initial visit with neg CARLTON    A pos ab neg, CBC wnl, HIV neg Rubella immune VZV immune Hep B neg Hep C neg   - Testing for GC / Chlamydia / trichomonas neg 10/19   - Genetic testing reviewed:  declines  - Prenatal anatomy ultrasound seen in care everywhere - normal Level 2    Growth US normal 10/19   - 28 weeks: GCT, H/H, RPR testing - normal. Did Tdap at work  - 36 weeks: GBS testing   - Recommend prenatal vitamins   - Discussed vaccine recommendations for influenza and COVID at any gestational age - declines  - Discussed RSV vaccination 32-36w from Sept -  - declines     2. Hypothyroidism    - on Synthroid 125 mcg   - Repeat labs monthly (last done )    3. Elevated BP x 1 without diagnosis of HTN   Suspect patient may  have undiagnosed CHTN given elevated BP at 20 weeks, but no further elevated BP   - PE panel with mild alk phos elevation, otherwise normal. UPCR 221    - Not currently on any medication for BP or ASA. Given late gestational age, I do not anticipate benefit from starting ASA.    - Recommend patient take BP daily and call if BP > 150/100.    - Growth US normal last visit   - If one more elevated BP, will plan to start  testing.   - IOL scheduled at 39 weeks.     4. Toothache, improved      Follow up in 1 week.    This note was electronically signed.    Lori Chaudhari MD  Obstetrics and Gynecology  [facility]

## 2024-12-19 ENCOUNTER — DOCUMENTATION (OUTPATIENT)
Dept: OBSTETRICS AND GYNECOLOGY | Facility: CLINIC | Age: 29
End: 2024-12-19
Payer: COMMERCIAL

## 2024-12-23 ENCOUNTER — ROUTINE PRENATAL (OUTPATIENT)
Dept: OBSTETRICS AND GYNECOLOGY | Facility: CLINIC | Age: 29
End: 2024-12-23
Payer: COMMERCIAL

## 2024-12-23 VITALS — SYSTOLIC BLOOD PRESSURE: 128 MMHG | DIASTOLIC BLOOD PRESSURE: 86 MMHG | WEIGHT: 241 LBS | BODY MASS INDEX: 37.75 KG/M2

## 2024-12-23 DIAGNOSIS — E03.9 ACQUIRED HYPOTHYROIDISM: ICD-10-CM

## 2024-12-23 DIAGNOSIS — O16.3 ELEVATED BLOOD PRESSURE AFFECTING PREGNANCY IN THIRD TRIMESTER, ANTEPARTUM: ICD-10-CM

## 2024-12-23 DIAGNOSIS — O09.93 SUPERVISION OF HIGH RISK PREGNANCY IN THIRD TRIMESTER: Primary | ICD-10-CM

## 2024-12-23 LAB
GLUCOSE UR STRIP-MCNC: NEGATIVE MG/DL
PROT UR STRIP-MCNC: NEGATIVE MG/DL

## 2024-12-30 ENCOUNTER — TELEPHONE (OUTPATIENT)
Dept: OBSTETRICS AND GYNECOLOGY | Facility: CLINIC | Age: 29
End: 2024-12-30

## 2024-12-30 NOTE — TELEPHONE ENCOUNTER
Provider: DR FRANKS    Caller: Sharon Delgadillo    Relationship to Patient: Self    Phone Number: [phone]    Reason for Call: OB PT CALLED TO CHECK STATUS OF FMLA PAPER WORK; IT LOOKS LIKE HER PAPER WORK HAS BEEN COMPLETED SINCE 12/10 BUT I AM UNSURE; UNABLE TO WT TO OFFICE.    PLEASE CALL PT TO ADVISE.

## 2024-12-30 NOTE — TELEPHONE ENCOUNTER
Spoke to pt and informed her that the new [facility] paperwork that  needed was just finished and will be turned into the front office today. Pt expressed understanding.

## 2025-01-03 ENCOUNTER — ROUTINE PRENATAL (OUTPATIENT)
Dept: OBSTETRICS AND GYNECOLOGY | Facility: CLINIC | Age: 30
End: 2025-01-03
Payer: COMMERCIAL

## 2025-01-03 VITALS — WEIGHT: 248 LBS | DIASTOLIC BLOOD PRESSURE: 86 MMHG | BODY MASS INDEX: 38.84 KG/M2 | SYSTOLIC BLOOD PRESSURE: 136 MMHG

## 2025-01-03 DIAGNOSIS — E03.9 ACQUIRED HYPOTHYROIDISM: ICD-10-CM

## 2025-01-03 DIAGNOSIS — O09.93 SUPERVISION OF HIGH RISK PREGNANCY IN THIRD TRIMESTER: ICD-10-CM

## 2025-01-03 DIAGNOSIS — O16.3 ELEVATED BLOOD PRESSURE AFFECTING PREGNANCY IN THIRD TRIMESTER, ANTEPARTUM: Primary | ICD-10-CM

## 2025-01-03 NOTE — PROGRESS NOTES
Chief Complaint   Patient presents with    Routine Prenatal Visit     No c/c / wants to be checked       Sharon Delgadillo is a 29 y.o.  at 38w4d who presents for follow up prenatal care. Overall, she is feeling well. She denies vaginal bleeding, leakage of fluid. Some occasional contractions. Baby is moving. Denies headache, chest pain, shortness of breath. She has been taking her BP and reports all normal (125-130/70-85).     No futher dental problems.       Pregnancy is complicated by:  - Hypothyroidism, on Synthroid - increased based on labs   - Hx MR BP x 1 at 20 weeks, no history of chronic HTN but does report that BP has been elevated in the past after night shifts   - First child with clubbed foot     /86   Wt 112 kg (248 lb)   BMI 38.84 kg/m²    General: No acute distress, sitting comfortably   CV: Regular heart rate  Lungs: Breathing unlabored  Abdomen: Soft, nontender, gravid,S=D   Pelvic: /-3      ASSESSMENT/PLAN    Routine prenatal care   - Prenatal labs: In care everywhere.   Urine culture pos on initial visit with neg CARLTON    A pos ab neg, CBC wnl, HIV neg Rubella immune VZV immune Hep B neg Hep C neg   - Testing for GC / Chlamydia / trichomonas neg 10/19   - Genetic testing reviewed:  declines  - Prenatal anatomy ultrasound seen in care everywhere - normal Level 2    Growth US normal 10/19   - 28 weeks: GCT, H/H, RPR testing - normal. Did Tdap at work  - 36 weeks: GBS testing   - Recommend prenatal vitamins   - Discussed vaccine recommendations for influenza and COVID at any gestational age - declines  - Discussed RSV vaccination 32-36w from Sept -  - declines     2. Hypothyroidism    - on Synthroid 125 mcg   - Repeat labs postpartum    3. Elevated BP x 1 without diagnosis of HTN   Suspect patient may have undiagnosed CHTN given elevated BP at 20 weeks, but no further elevated BP   - PE panel with mild alk phos elevation, otherwise normal. UPCR 221    - Not currently on any  medication for BP or ASA. Given late gestational age, I do not anticipate benefit from starting ASA.    - Recommend patient take BP daily and call if BP > 150/100.    - Growth US normal last visit   - If one more elevated BP, will plan to start  testing.   - IOL scheduled at 39 weeks ()       Follow up for IOL.     This note was electronically signed.    Lori Chaudhari MD  Obstetrics and Gynecology  [facility]

## 2025-01-05 RX ORDER — OXYTOCIN/0.9 % SODIUM CHLORIDE 30/500 ML
999 PLASTIC BAG, INJECTION (ML) INTRAVENOUS ONCE
Status: CANCELLED | OUTPATIENT
Start: 2025-01-05

## 2025-01-05 RX ORDER — FAMOTIDINE 10 MG
20 TABLET ORAL EVERY 12 HOURS SCHEDULED
Status: CANCELLED | OUTPATIENT
Start: 2025-01-05

## 2025-01-05 RX ORDER — ACETAMINOPHEN 325 MG/1
650 TABLET ORAL EVERY 4 HOURS PRN
Status: CANCELLED | OUTPATIENT
Start: 2025-01-05

## 2025-01-05 RX ORDER — BUTORPHANOL TARTRATE 1 MG/ML
1 INJECTION, SOLUTION INTRAMUSCULAR; INTRAVENOUS
Status: CANCELLED | OUTPATIENT
Start: 2025-01-05

## 2025-01-05 RX ORDER — OXYTOCIN/0.9 % SODIUM CHLORIDE 30/500 ML
250 PLASTIC BAG, INJECTION (ML) INTRAVENOUS CONTINUOUS
Status: CANCELLED | OUTPATIENT
Start: 2025-01-05 | End: 2025-01-05

## 2025-01-05 RX ORDER — MORPHINE SULFATE 10 MG/ML
2 INJECTION INTRAMUSCULAR; INTRAVENOUS; SUBCUTANEOUS
Status: CANCELLED | OUTPATIENT
Start: 2025-01-05 | End: 2025-01-15

## 2025-01-05 RX ORDER — ONDANSETRON 4 MG/1
4 TABLET, ORALLY DISINTEGRATING ORAL EVERY 6 HOURS PRN
Status: CANCELLED | OUTPATIENT
Start: 2025-01-05

## 2025-01-05 RX ORDER — LIDOCAINE HYDROCHLORIDE 10 MG/ML
0.5 INJECTION, SOLUTION EPIDURAL; INFILTRATION; INTRACAUDAL; PERINEURAL ONCE AS NEEDED
Status: CANCELLED | OUTPATIENT
Start: 2025-01-05

## 2025-01-05 RX ORDER — CARBOPROST TROMETHAMINE 250 UG/ML
250 INJECTION, SOLUTION INTRAMUSCULAR AS NEEDED
Status: CANCELLED | OUTPATIENT
Start: 2025-01-05 | End: 2025-01-06

## 2025-01-05 RX ORDER — BUTORPHANOL TARTRATE 1 MG/ML
2 INJECTION, SOLUTION INTRAMUSCULAR; INTRAVENOUS
Status: CANCELLED | OUTPATIENT
Start: 2025-01-05

## 2025-01-05 RX ORDER — OXYTOCIN/0.9 % SODIUM CHLORIDE 30/500 ML
2-20 PLASTIC BAG, INJECTION (ML) INTRAVENOUS
Status: CANCELLED | OUTPATIENT
Start: 2025-01-05

## 2025-01-05 RX ORDER — FAMOTIDINE 10 MG/ML
20 INJECTION, SOLUTION INTRAVENOUS EVERY 12 HOURS SCHEDULED
Status: CANCELLED | OUTPATIENT
Start: 2025-01-05

## 2025-01-05 RX ORDER — PROMETHAZINE HYDROCHLORIDE 25 MG/1
12.5 TABLET ORAL EVERY 6 HOURS PRN
Status: CANCELLED | OUTPATIENT
Start: 2025-01-05

## 2025-01-05 RX ORDER — METHYLERGONOVINE MALEATE 0.2 MG/ML
200 INJECTION INTRAVENOUS ONCE AS NEEDED
Status: CANCELLED | OUTPATIENT
Start: 2025-01-05 | End: 2025-01-06

## 2025-01-05 RX ORDER — PROMETHAZINE HYDROCHLORIDE 25 MG/1
12.5 SUPPOSITORY RECTAL EVERY 6 HOURS PRN
Status: CANCELLED | OUTPATIENT
Start: 2025-01-05

## 2025-01-05 RX ORDER — MISOPROSTOL 100 UG/1
800 TABLET ORAL AS NEEDED
Status: CANCELLED | OUTPATIENT
Start: 2025-01-05 | End: 2025-01-06

## 2025-01-05 RX ORDER — MAGNESIUM CARB/ALUMINUM HYDROX 105-160MG
30 TABLET,CHEWABLE ORAL ONCE
Status: CANCELLED | OUTPATIENT
Start: 2025-01-05 | End: 2025-01-05

## 2025-01-05 RX ORDER — SODIUM CHLORIDE 0.9 % (FLUSH) 0.9 %
10 SYRINGE (ML) INJECTION EVERY 12 HOURS SCHEDULED
Status: CANCELLED | OUTPATIENT
Start: 2025-01-05

## 2025-01-05 RX ORDER — SODIUM CHLORIDE, SODIUM LACTATE, POTASSIUM CHLORIDE, CALCIUM CHLORIDE 600; 310; 30; 20 MG/100ML; MG/100ML; MG/100ML; MG/100ML
125 INJECTION, SOLUTION INTRAVENOUS CONTINUOUS
Status: CANCELLED | OUTPATIENT
Start: 2025-01-05 | End: 2025-01-06

## 2025-01-05 RX ORDER — SODIUM CHLORIDE 0.9 % (FLUSH) 0.9 %
10 SYRINGE (ML) INJECTION AS NEEDED
Status: CANCELLED | OUTPATIENT
Start: 2025-01-05

## 2025-01-05 RX ORDER — IBUPROFEN 200 MG
600 TABLET ORAL EVERY 6 HOURS PRN
Status: CANCELLED | OUTPATIENT
Start: 2025-01-05

## 2025-01-05 RX ORDER — SODIUM CHLORIDE 9 MG/ML
40 INJECTION, SOLUTION INTRAVENOUS AS NEEDED
Status: CANCELLED | OUTPATIENT
Start: 2025-01-05

## 2025-01-05 RX ORDER — ONDANSETRON 2 MG/ML
4 INJECTION INTRAMUSCULAR; INTRAVENOUS EVERY 6 HOURS PRN
Status: CANCELLED | OUTPATIENT
Start: 2025-01-05

## 2025-01-05 RX ORDER — OXYCODONE AND ACETAMINOPHEN 5; 325 MG/1; MG/1
1 TABLET ORAL EVERY 4 HOURS PRN
Status: CANCELLED | OUTPATIENT
Start: 2025-01-05 | End: 2025-01-15

## 2025-01-05 NOTE — H&P (VIEW-ONLY)
Jenna Victoria  : 1995  MRN: [id number]  CSN: [id number]    History and Physical    Subjective     Jenna Victoria is a 29 y.o. year old  with an Estimated Date of Delivery: 25 presenting for induction of labor for elective at term per patient request.  Fetal EFW is AGA and pelvis is clinically adequate.    Risks of labor induction including prolongation of labor, increased risks for both  section and operative vaginal birth have been discussed at length.     Prenatal care has been with  Dr. Chaudhari.  It has been complicated by hypothyroidism and mild range BP x 1 .    OB History    Para Term  AB Living   2 1 1 0 0 1   SAB IAB Ectopic Molar Multiple Live Births   0 0 0 0 0 1      # Outcome Date GA Lbr Jordan/2nd Weight Sex Type Anes PTL Lv   2 Current            1 Term 10/02/21 39w4d  3490 g (7 lb 11.1 oz) M Vag-Spont EPI N SHYANNE      Name: JENNA VICTORIA      Apgar1: 9  Apgar5: 9     Past Medical History:   Diagnosis Date    ADHD (attention deficit hyperactivity disorder) 2017    Depression     Glaucoma 2015    Monitored yearly    Hyperlipidemia     resolved    Hypothyroidism     Obesity     Noted after giving birth    Ovarian cyst     Urinary tract infection      Past Surgical History:   Procedure Laterality Date    KNEE ACL RECONSTRUCTION  2014       Current Outpatient Medications:     Ferrous Sulfate (IRON PO), Take 1 tablet by mouth Daily., Disp: , Rfl:     levothyroxine (Synthroid) 125 MCG tablet, Take 1 tablet by mouth Daily., Disp: 30 tablet, Rfl: 11    ondansetron ODT (ZOFRAN-ODT) 4 MG disintegrating tablet, Place 1 tablet on the tongue Every 8 (Eight) Hours As Needed for Nausea or Vomiting., Disp: 27 tablet, Rfl: 1    prenatal vitamin (prenatal, CLASSIC, vitamin) tablet, Take  by mouth Daily., Disp: , Rfl:     No Known Allergies  Social History    Tobacco Use      Smoking status: Never        Passive exposure: Never      Smokeless tobacco:  Never    Review of Systems   Constitutional:  Negative for chills and fever.   Respiratory:  Negative for cough and shortness of breath.    Gastrointestinal:  Negative for abdominal pain, nausea and vomiting.         Objective   /86   Wt 112 kg (248 lb)   BMI 38.84 kg/m²     General: well developed; well nourished  no acute distress  mentation appropriate   Abdomen: fundus firm and non-tender   Cervix: At last prenatal visit - 2 cm / 60 % / -3 / medium / posterior   Presentation: At last prenatal visit - cephalic     Prenatal Labs  Lab Results   Component Value Date    HGB 13.0 11/08/2024    ABSCRN Negative 10/19/2024     10/19/2024    STREPGPB neg 12/16/2024    CHLAMNAA Negative 10/15/2024    NGONORRHON Negative 10/15/2024            Assessment   IUP with an Estimated Date of Delivery: 1/13/25  Induction of labor because of elective at term per patient request  Group B strep status: negative       Plan   Pitocin induction  2. No antibiotics indicated  3. Epidural upon patient request     Lori Chaudhari MD

## 2025-01-05 NOTE — H&P
Jenna Victoria  : 1995  MRN: [id number]  CSN: [id number]    History and Physical    Subjective     Jenna Victoria is a 29 y.o. year old  with an Estimated Date of Delivery: 25 presenting for induction of labor for elective at term per patient request.  Fetal EFW is AGA and pelvis is clinically adequate.    Risks of labor induction including prolongation of labor, increased risks for both  section and operative vaginal birth have been discussed at length.     Prenatal care has been with  Dr. Chaudhari.  It has been complicated by hypothyroidism and mild range BP x 1 .    OB History    Para Term  AB Living   2 1 1 0 0 1   SAB IAB Ectopic Molar Multiple Live Births   0 0 0 0 0 1      # Outcome Date GA Lbr Jordan/2nd Weight Sex Type Anes PTL Lv   2 Current            1 Term 10/02/21 39w4d  3490 g (7 lb 11.1 oz) M Vag-Spont EPI N SHYANNE      Name: JENNA VICTORIA      Apgar1: 9  Apgar5: 9     Past Medical History:   Diagnosis Date    ADHD (attention deficit hyperactivity disorder) 2017    Depression     Glaucoma 2015    Monitored yearly    Hyperlipidemia     resolved    Hypothyroidism     Obesity     Noted after giving birth    Ovarian cyst     Urinary tract infection      Past Surgical History:   Procedure Laterality Date    KNEE ACL RECONSTRUCTION  2014       Current Outpatient Medications:     Ferrous Sulfate (IRON PO), Take 1 tablet by mouth Daily., Disp: , Rfl:     levothyroxine (Synthroid) 125 MCG tablet, Take 1 tablet by mouth Daily., Disp: 30 tablet, Rfl: 11    ondansetron ODT (ZOFRAN-ODT) 4 MG disintegrating tablet, Place 1 tablet on the tongue Every 8 (Eight) Hours As Needed for Nausea or Vomiting., Disp: 27 tablet, Rfl: 1    prenatal vitamin (prenatal, CLASSIC, vitamin) tablet, Take  by mouth Daily., Disp: , Rfl:     No Known Allergies  Social History    Tobacco Use      Smoking status: Never        Passive exposure: Never      Smokeless tobacco:  Never    Review of Systems   Constitutional:  Negative for chills and fever.   Respiratory:  Negative for cough and shortness of breath.    Gastrointestinal:  Negative for abdominal pain, nausea and vomiting.         Objective   /86   Wt 112 kg (248 lb)   BMI 38.84 kg/m²     General: well developed; well nourished  no acute distress  mentation appropriate   Abdomen: fundus firm and non-tender   Cervix: At last prenatal visit - 2 cm / 60 % / -3 / medium / posterior   Presentation: At last prenatal visit - cephalic     Prenatal Labs  Lab Results   Component Value Date    HGB 13.0 11/08/2024    ABSCRN Negative 10/19/2024     10/19/2024    STREPGPB neg 12/16/2024    CHLAMNAA Negative 10/15/2024    NGONORRHON Negative 10/15/2024            Assessment   IUP with an Estimated Date of Delivery: 1/13/25  Induction of labor because of elective at term per patient request  Group B strep status: negative       Plan   Pitocin induction  2. No antibiotics indicated  3. Epidural upon patient request     Lori Chaudhari MD

## 2025-01-06 ENCOUNTER — ANESTHESIA EVENT (OUTPATIENT)
Dept: LABOR AND DELIVERY | Facility: HOSPITAL | Age: 30
End: 2025-01-06
Payer: COMMERCIAL

## 2025-01-06 ENCOUNTER — HOSPITAL ENCOUNTER (INPATIENT)
Facility: HOSPITAL | Age: 30
LOS: 2 days | Discharge: HOME | End: 2025-01-08
Attending: STUDENT IN AN ORGANIZED HEALTH CARE EDUCATION/TRAINING PROGRAM | Admitting: STUDENT IN AN ORGANIZED HEALTH CARE EDUCATION/TRAINING PROGRAM
Payer: COMMERCIAL

## 2025-01-06 ENCOUNTER — ANESTHESIA (OUTPATIENT)
Dept: LABOR AND DELIVERY | Facility: HOSPITAL | Age: 30
End: 2025-01-06
Payer: COMMERCIAL

## 2025-01-06 ENCOUNTER — HOSPITAL ENCOUNTER (OUTPATIENT)
Dept: LABOR AND DELIVERY | Facility: HOSPITAL | Age: 30
Discharge: HOME | End: 2025-01-06
Payer: COMMERCIAL

## 2025-01-06 DIAGNOSIS — O09.93 SUPERVISION OF HIGH RISK PREGNANCY IN THIRD TRIMESTER: ICD-10-CM

## 2025-01-06 PROBLEM — Z34.90 TERM PREGNANCY: Status: ACTIVE | Noted: 2025-01-06

## 2025-01-06 LAB
ABO GROUP BLD: NORMAL
BLD GP AB SCN SERPL QL: NEGATIVE
DEPRECATED RDW RBC AUTO: 44.4 FL (ref 37–54)
ERYTHROCYTE [DISTWIDTH] IN BLOOD BY AUTOMATED COUNT: 13.7 % (ref 12.3–15.4)
HCT VFR BLD AUTO: 35.2 % (ref 34–46.6)
HGB BLD-MCNC: 11.3 G/DL (ref 12–15.9)
MCH RBC QN AUTO: 28.8 PG (ref 26.6–33)
MCHC RBC AUTO-ENTMCNC: 32.1 G/DL (ref 31.5–35.7)
MCV RBC AUTO: 89.8 FL (ref 79–97)
PLATELET # BLD AUTO: 266 10*3/MM3 (ref 140–450)
PMV BLD AUTO: 10 FL (ref 6–12)
RBC # BLD AUTO: 3.92 10*6/MM3 (ref 3.77–5.28)
RH BLD: POSITIVE
T&S EXPIRATION DATE: NORMAL
WBC NRBC COR # BLD AUTO: 8.1 10*3/MM3 (ref 3.4–10.8)

## 2025-01-06 PROCEDURE — 59400 OBSTETRICAL CARE: CPT | Performed by: OBSTETRICS & GYNECOLOGY

## 2025-01-06 PROCEDURE — C1755 CATHETER, INTRASPINAL: HCPCS

## 2025-01-06 PROCEDURE — 25010000002 ROPIVACAINE PER 1 MG: Performed by: ANESTHESIOLOGY

## 2025-01-06 PROCEDURE — 25010000002 LIDOCAINE PF 1% 1 % SOLUTION: Performed by: STUDENT IN AN ORGANIZED HEALTH CARE EDUCATION/TRAINING PROGRAM

## 2025-01-06 PROCEDURE — 85027 COMPLETE CBC AUTOMATED: CPT | Performed by: STUDENT IN AN ORGANIZED HEALTH CARE EDUCATION/TRAINING PROGRAM

## 2025-01-06 PROCEDURE — 3E033VJ INTRODUCTION OF OTHER HORMONE INTO PERIPHERAL VEIN, PERCUTANEOUS APPROACH: ICD-10-PCS | Performed by: STUDENT IN AN ORGANIZED HEALTH CARE EDUCATION/TRAINING PROGRAM

## 2025-01-06 PROCEDURE — 10H07YZ INSERTION OF OTHER DEVICE INTO PRODUCTS OF CONCEPTION, VIA NATURAL OR ARTIFICIAL OPENING: ICD-10-PCS | Performed by: OBSTETRICS & GYNECOLOGY

## 2025-01-06 PROCEDURE — 25810000003 LACTATED RINGERS PER 1000 ML: Performed by: STUDENT IN AN ORGANIZED HEALTH CARE EDUCATION/TRAINING PROGRAM

## 2025-01-06 PROCEDURE — 25010000002 ONDANSETRON PER 1 MG: Performed by: ANESTHESIOLOGY

## 2025-01-06 PROCEDURE — 25810000003 LACTATED RINGERS SOLUTION: Performed by: OBSTETRICS & GYNECOLOGY

## 2025-01-06 PROCEDURE — 25010000002 LIDOCAINE-EPINEPHRINE (PF) 1.5 %-1:200000 SOLUTION: Performed by: ANESTHESIOLOGY

## 2025-01-06 PROCEDURE — C1755 CATHETER, INTRASPINAL: HCPCS | Performed by: ANESTHESIOLOGY

## 2025-01-06 PROCEDURE — 25010000002 BUPIVACAINE (PF) 0.25 % SOLUTION: Performed by: ANESTHESIOLOGY

## 2025-01-06 PROCEDURE — 86900 BLOOD TYPING SEROLOGIC ABO: CPT | Performed by: STUDENT IN AN ORGANIZED HEALTH CARE EDUCATION/TRAINING PROGRAM

## 2025-01-06 PROCEDURE — 51703 INSERT BLADDER CATH COMPLEX: CPT

## 2025-01-06 PROCEDURE — 59025 FETAL NON-STRESS TEST: CPT

## 2025-01-06 PROCEDURE — 86850 RBC ANTIBODY SCREEN: CPT | Performed by: STUDENT IN AN ORGANIZED HEALTH CARE EDUCATION/TRAINING PROGRAM

## 2025-01-06 PROCEDURE — 25010000002 FENTANYL CITRATE (PF) 50 MCG/ML SOLUTION: Performed by: ANESTHESIOLOGY

## 2025-01-06 PROCEDURE — 10907ZC DRAINAGE OF AMNIOTIC FLUID, THERAPEUTIC FROM PRODUCTS OF CONCEPTION, VIA NATURAL OR ARTIFICIAL OPENING: ICD-10-PCS | Performed by: OBSTETRICS & GYNECOLOGY

## 2025-01-06 PROCEDURE — 86901 BLOOD TYPING SEROLOGIC RH(D): CPT | Performed by: STUDENT IN AN ORGANIZED HEALTH CARE EDUCATION/TRAINING PROGRAM

## 2025-01-06 PROCEDURE — 86780 TREPONEMA PALLIDUM: CPT | Performed by: STUDENT IN AN ORGANIZED HEALTH CARE EDUCATION/TRAINING PROGRAM

## 2025-01-06 RX ORDER — SODIUM CHLORIDE, SODIUM LACTATE, POTASSIUM CHLORIDE, CALCIUM CHLORIDE 600; 310; 30; 20 MG/100ML; MG/100ML; MG/100ML; MG/100ML
125 INJECTION, SOLUTION INTRAVENOUS CONTINUOUS
Status: ACTIVE | OUTPATIENT
Start: 2025-01-06 | End: 2025-01-07

## 2025-01-06 RX ORDER — OXYTOCIN/0.9 % SODIUM CHLORIDE 30/500 ML
2-20 PLASTIC BAG, INJECTION (ML) INTRAVENOUS
Status: DISCONTINUED | OUTPATIENT
Start: 2025-01-06 | End: 2025-01-07 | Stop reason: HOSPADM

## 2025-01-06 RX ORDER — LIDOCAINE HYDROCHLORIDE AND EPINEPHRINE 15; 5 MG/ML; UG/ML
INJECTION, SOLUTION EPIDURAL AS NEEDED
Status: DISCONTINUED | OUTPATIENT
Start: 2025-01-06 | End: 2025-01-07 | Stop reason: SURG

## 2025-01-06 RX ORDER — FAMOTIDINE 10 MG/ML
20 INJECTION, SOLUTION INTRAVENOUS EVERY 12 HOURS SCHEDULED
Status: DISCONTINUED | OUTPATIENT
Start: 2025-01-06 | End: 2025-01-07 | Stop reason: HOSPADM

## 2025-01-06 RX ORDER — SODIUM CHLORIDE 0.9 % (FLUSH) 0.9 %
10 SYRINGE (ML) INJECTION AS NEEDED
Status: DISCONTINUED | OUTPATIENT
Start: 2025-01-06 | End: 2025-01-07 | Stop reason: HOSPADM

## 2025-01-06 RX ORDER — EPHEDRINE SULFATE 5 MG/ML
10 INJECTION INTRAVENOUS
Status: DISCONTINUED | OUTPATIENT
Start: 2025-01-06 | End: 2025-01-07 | Stop reason: HOSPADM

## 2025-01-06 RX ORDER — SODIUM CHLORIDE 9 MG/ML
40 INJECTION, SOLUTION INTRAVENOUS AS NEEDED
Status: DISCONTINUED | OUTPATIENT
Start: 2025-01-06 | End: 2025-01-07 | Stop reason: HOSPADM

## 2025-01-06 RX ORDER — BUPIVACAINE HYDROCHLORIDE 2.5 MG/ML
INJECTION, SOLUTION EPIDURAL; INFILTRATION; INTRACAUDAL AS NEEDED
Status: DISCONTINUED | OUTPATIENT
Start: 2025-01-06 | End: 2025-01-07 | Stop reason: SURG

## 2025-01-06 RX ORDER — BUTORPHANOL TARTRATE 1 MG/ML
1 INJECTION, SOLUTION INTRAMUSCULAR; INTRAVENOUS
Status: DISCONTINUED | OUTPATIENT
Start: 2025-01-06 | End: 2025-01-07 | Stop reason: HOSPADM

## 2025-01-06 RX ORDER — FENTANYL CITRATE 50 UG/ML
INJECTION, SOLUTION INTRAMUSCULAR; INTRAVENOUS AS NEEDED
Status: DISCONTINUED | OUTPATIENT
Start: 2025-01-06 | End: 2025-01-07 | Stop reason: SURG

## 2025-01-06 RX ORDER — CITRIC ACID/SODIUM CITRATE 334-500MG
30 SOLUTION, ORAL ORAL ONCE
Status: DISCONTINUED | OUTPATIENT
Start: 2025-01-06 | End: 2025-01-07 | Stop reason: HOSPADM

## 2025-01-06 RX ORDER — ACETAMINOPHEN 325 MG/1
650 TABLET ORAL EVERY 4 HOURS PRN
Status: DISCONTINUED | OUTPATIENT
Start: 2025-01-06 | End: 2025-01-07 | Stop reason: HOSPADM

## 2025-01-06 RX ORDER — ONDANSETRON 2 MG/ML
4 INJECTION INTRAMUSCULAR; INTRAVENOUS ONCE AS NEEDED
Status: COMPLETED | OUTPATIENT
Start: 2025-01-06 | End: 2025-01-06

## 2025-01-06 RX ORDER — MORPHINE SULFATE 2 MG/ML
2 INJECTION, SOLUTION INTRAMUSCULAR; INTRAVENOUS
Status: DISCONTINUED | OUTPATIENT
Start: 2025-01-06 | End: 2025-01-07 | Stop reason: HOSPADM

## 2025-01-06 RX ORDER — FAMOTIDINE 20 MG/1
20 TABLET, FILM COATED ORAL EVERY 12 HOURS SCHEDULED
Status: DISCONTINUED | OUTPATIENT
Start: 2025-01-06 | End: 2025-01-07 | Stop reason: HOSPADM

## 2025-01-06 RX ORDER — ROPIVACAINE HYDROCHLORIDE 2 MG/ML
15 INJECTION, SOLUTION EPIDURAL; INFILTRATION; PERINEURAL CONTINUOUS
Status: DISCONTINUED | OUTPATIENT
Start: 2025-01-06 | End: 2025-01-08 | Stop reason: HOSPADM

## 2025-01-06 RX ORDER — EPHEDRINE SULFATE 5 MG/ML
INJECTION INTRAVENOUS
Status: DISCONTINUED
Start: 2025-01-06 | End: 2025-01-08 | Stop reason: HOSPADM

## 2025-01-06 RX ORDER — IBUPROFEN 600 MG/1
600 TABLET, FILM COATED ORAL EVERY 6 HOURS PRN
Status: DISCONTINUED | OUTPATIENT
Start: 2025-01-06 | End: 2025-01-07 | Stop reason: HOSPADM

## 2025-01-06 RX ORDER — CARBOPROST TROMETHAMINE 250 UG/ML
250 INJECTION, SOLUTION INTRAMUSCULAR AS NEEDED
Status: DISCONTINUED | OUTPATIENT
Start: 2025-01-06 | End: 2025-01-07 | Stop reason: HOSPADM

## 2025-01-06 RX ORDER — OXYTOCIN/0.9 % SODIUM CHLORIDE 30/500 ML
250 PLASTIC BAG, INJECTION (ML) INTRAVENOUS CONTINUOUS
Status: ACTIVE | OUTPATIENT
Start: 2025-01-06 | End: 2025-01-06

## 2025-01-06 RX ORDER — OXYCODONE AND ACETAMINOPHEN 5; 325 MG/1; MG/1
1 TABLET ORAL EVERY 4 HOURS PRN
Status: DISCONTINUED | OUTPATIENT
Start: 2025-01-06 | End: 2025-01-07 | Stop reason: HOSPADM

## 2025-01-06 RX ORDER — ONDANSETRON 4 MG/1
4 TABLET, ORALLY DISINTEGRATING ORAL EVERY 6 HOURS PRN
Status: DISCONTINUED | OUTPATIENT
Start: 2025-01-06 | End: 2025-01-07 | Stop reason: HOSPADM

## 2025-01-06 RX ORDER — MAGNESIUM CARB/ALUMINUM HYDROX 105-160MG
30 TABLET,CHEWABLE ORAL ONCE
Status: DISCONTINUED | OUTPATIENT
Start: 2025-01-06 | End: 2025-01-07 | Stop reason: HOSPADM

## 2025-01-06 RX ORDER — FAMOTIDINE 10 MG/ML
20 INJECTION, SOLUTION INTRAVENOUS ONCE AS NEEDED
Status: DISCONTINUED | OUTPATIENT
Start: 2025-01-06 | End: 2025-01-07 | Stop reason: HOSPADM

## 2025-01-06 RX ORDER — METHYLERGONOVINE MALEATE 0.2 MG/ML
200 INJECTION INTRAVENOUS ONCE AS NEEDED
Status: DISCONTINUED | OUTPATIENT
Start: 2025-01-06 | End: 2025-01-07 | Stop reason: HOSPADM

## 2025-01-06 RX ORDER — OXYTOCIN/0.9 % SODIUM CHLORIDE 30/500 ML
999 PLASTIC BAG, INJECTION (ML) INTRAVENOUS ONCE
Status: DISCONTINUED | OUTPATIENT
Start: 2025-01-06 | End: 2025-01-07 | Stop reason: HOSPADM

## 2025-01-06 RX ORDER — LIDOCAINE HYDROCHLORIDE 10 MG/ML
0.5 INJECTION, SOLUTION EPIDURAL; INFILTRATION; INTRACAUDAL; PERINEURAL ONCE AS NEEDED
Status: COMPLETED | OUTPATIENT
Start: 2025-01-06 | End: 2025-01-06

## 2025-01-06 RX ORDER — SODIUM CHLORIDE 0.9 % (FLUSH) 0.9 %
10 SYRINGE (ML) INJECTION EVERY 12 HOURS SCHEDULED
Status: DISCONTINUED | OUTPATIENT
Start: 2025-01-06 | End: 2025-01-07 | Stop reason: HOSPADM

## 2025-01-06 RX ORDER — ONDANSETRON 2 MG/ML
4 INJECTION INTRAMUSCULAR; INTRAVENOUS EVERY 6 HOURS PRN
Status: DISCONTINUED | OUTPATIENT
Start: 2025-01-06 | End: 2025-01-07 | Stop reason: HOSPADM

## 2025-01-06 RX ORDER — PROMETHAZINE HYDROCHLORIDE 12.5 MG/1
12.5 TABLET ORAL EVERY 6 HOURS PRN
Status: DISCONTINUED | OUTPATIENT
Start: 2025-01-06 | End: 2025-01-07 | Stop reason: HOSPADM

## 2025-01-06 RX ORDER — MISOPROSTOL 200 UG/1
800 TABLET ORAL AS NEEDED
Status: DISCONTINUED | OUTPATIENT
Start: 2025-01-06 | End: 2025-01-07 | Stop reason: HOSPADM

## 2025-01-06 RX ORDER — DIPHENHYDRAMINE HYDROCHLORIDE 50 MG/ML
12.5 INJECTION INTRAMUSCULAR; INTRAVENOUS EVERY 8 HOURS PRN
Status: DISCONTINUED | OUTPATIENT
Start: 2025-01-06 | End: 2025-01-07 | Stop reason: HOSPADM

## 2025-01-06 RX ORDER — BUTORPHANOL TARTRATE 2 MG/ML
2 INJECTION, SOLUTION INTRAMUSCULAR; INTRAVENOUS
Status: DISCONTINUED | OUTPATIENT
Start: 2025-01-06 | End: 2025-01-07 | Stop reason: HOSPADM

## 2025-01-06 RX ORDER — PROMETHAZINE HYDROCHLORIDE 12.5 MG/1
12.5 SUPPOSITORY RECTAL EVERY 6 HOURS PRN
Status: DISCONTINUED | OUTPATIENT
Start: 2025-01-06 | End: 2025-01-07 | Stop reason: HOSPADM

## 2025-01-06 RX ADMIN — SODIUM CHLORIDE, POTASSIUM CHLORIDE, SODIUM LACTATE AND CALCIUM CHLORIDE 500 ML: 600; 310; 30; 20 INJECTION, SOLUTION INTRAVENOUS at 21:04

## 2025-01-06 RX ADMIN — LIDOCAINE HYDROCHLORIDE AND EPINEPHRINE 3 ML: 15; 5 INJECTION, SOLUTION EPIDURAL at 15:43

## 2025-01-06 RX ADMIN — LIDOCAINE HYDROCHLORIDE AND EPINEPHRINE 2 ML: 15; 5 INJECTION, SOLUTION EPIDURAL at 15:44

## 2025-01-06 RX ADMIN — FENTANYL CITRATE 100 MCG: 50 INJECTION, SOLUTION INTRAMUSCULAR; INTRAVENOUS at 15:45

## 2025-01-06 RX ADMIN — LIDOCAINE HYDROCHLORIDE 0.5 ML: 10 INJECTION, SOLUTION EPIDURAL; INFILTRATION; INTRACAUDAL; PERINEURAL at 08:21

## 2025-01-06 RX ADMIN — BUPIVACAINE HYDROCHLORIDE 8 ML: 2.5 INJECTION, SOLUTION EPIDURAL; INFILTRATION; INTRACAUDAL; PERINEURAL at 15:45

## 2025-01-06 RX ADMIN — Medication 2 MILLI-UNITS/MIN: at 09:07

## 2025-01-06 RX ADMIN — ONDANSETRON 4 MG: 2 INJECTION INTRAMUSCULAR; INTRAVENOUS at 20:41

## 2025-01-06 RX ADMIN — ROPIVACAINE HYDROCHLORIDE 14 ML/HR: 2 INJECTION, SOLUTION EPIDURAL; INFILTRATION at 15:49

## 2025-01-06 RX ADMIN — SODIUM CHLORIDE, POTASSIUM CHLORIDE, SODIUM LACTATE AND CALCIUM CHLORIDE 125 ML/HR: 600; 310; 30; 20 INJECTION, SOLUTION INTRAVENOUS at 08:21

## 2025-01-06 NOTE — INTERVAL H&P NOTE
H&P reviewed. The patient was examined and there are no changes to the H&P.    FHR tracing     Baseline 140 bpm, moderate variability, + accelerations, no decelerations.   Category 1 tracing -- will start pitocin.     Lori Chaudhari MD  Obstetrics and Gynecology  [facility]

## 2025-01-06 NOTE — PROGRESS NOTES
Padilla Delgadillo  : 1995  MRN: [id number]  CSN: [id number]    Labor progress note    Subjective   She is comfortable with the epidural.  Patient has not wanted her water broken during the day. She is open to it now.      Objective   Min/max vitals past 24 hours:  Temp  Min: 98.2 °F (36.8 °C)  Max: 98.4 °F (36.9 °C)   BP  Min: 109/71  Max: 148/91   Pulse  Min: 85  Max: 106   Resp  Min: 16  Max: 16        FHT's: reassuring and category 1   Cervix: By me - 3/50/-2   Contractions: regular every 2 minutes        Assessment   IUP at 39w0d  Fetal status reassuring  Elective IOL     Plan   Continue with induction  AROM - clear fluid seen   IUPC placed  Recheck cervix in 2-4 hours    Heidi Brown MD  2025  18:40 EST

## 2025-01-06 NOTE — ANESTHESIA PROCEDURE NOTES
Labor Epidural      Patient reassessed immediately prior to procedure    Patient location during procedure: OB  Performed By  Anesthesiologist: Raman Burgos DO  Preanesthetic Checklist  Completed: patient identified, IV checked, site marked, risks and benefits discussed, surgical consent, monitors and equipment checked, pre-op evaluation and timeout performed  Prep:  Pt Position:sitting  Sterile Tech:gloves, mask, sterile barrier and cap  Prep:chlorhexidine gluconate and isopropyl alcohol  Monitoring:blood pressure monitoring and continuous pulse oximetry  Epidural Block Procedure:  Approach:midline  Guidance:landmark technique and palpation technique  Location:L3-L4  Needle Type:Tuohy  Needle Gauge:17 G  Loss of Resistance Medium: air  Loss of Resistance: 5cm  Cath Depth at skin:11 cm  Paresthesia: none  Aspiration:negative  Test Dose:negative  Number of Attempts: 1  Post Assessment:  Dressing:secured with tape and occlusive dressing applied (Tegaderm Placed)  Pt Tolerance:patient tolerated the procedure well with no apparent complications  Complications:no

## 2025-01-06 NOTE — ANESTHESIA PREPROCEDURE EVALUATION
Anesthesia Evaluation     Patient summary reviewed and Nursing notes reviewed                Airway   Mallampati: II  TM distance: >3 FB  Neck ROM: full  No difficulty expected  Dental      Pulmonary - negative pulmonary ROS   Cardiovascular     (+) hyperlipidemia      Neuro/Psych  (+) psychiatric history Anxiety and ADHD  GI/Hepatic/Renal/Endo    (+) obesity, thyroid problem hypothyroidism    Musculoskeletal (-) negative ROS    Abdominal    Substance History - negative use     OB/GYN    (+) Pregnant        Other                    Anesthesia Plan    ASA 2     epidural       Anesthetic plan, risks, benefits, and alternatives have been provided, discussed and informed consent has been obtained with: patient.    Use of blood products discussed with patient .      CODE STATUS:    Code Status (Patient has no pulse and is not breathing): CPR (Attempt to Resuscitate)  Medical Interventions (Patient has pulse or is breathing): Full Support

## 2025-01-07 LAB
BASOPHILS # BLD AUTO: 0.04 10*3/MM3 (ref 0–0.2)
BASOPHILS NFR BLD AUTO: 0.4 % (ref 0–1.5)
DEPRECATED RDW RBC AUTO: 44.8 FL (ref 37–54)
EOSINOPHIL # BLD AUTO: 0.1 10*3/MM3 (ref 0–0.4)
EOSINOPHIL NFR BLD AUTO: 1 % (ref 0.3–6.2)
ERYTHROCYTE [DISTWIDTH] IN BLOOD BY AUTOMATED COUNT: 13.5 % (ref 12.3–15.4)
HCT VFR BLD AUTO: 32.2 % (ref 34–46.6)
HGB BLD-MCNC: 10 G/DL (ref 12–15.9)
IMM GRANULOCYTES # BLD AUTO: 0.05 10*3/MM3 (ref 0–0.05)
IMM GRANULOCYTES NFR BLD AUTO: 0.5 % (ref 0–0.5)
LYMPHOCYTES # BLD AUTO: 1.86 10*3/MM3 (ref 0.7–3.1)
LYMPHOCYTES NFR BLD AUTO: 18.7 % (ref 19.6–45.3)
MCH RBC QN AUTO: 28.2 PG (ref 26.6–33)
MCHC RBC AUTO-ENTMCNC: 31.1 G/DL (ref 31.5–35.7)
MCV RBC AUTO: 91 FL (ref 79–97)
MONOCYTES # BLD AUTO: 0.83 10*3/MM3 (ref 0.1–0.9)
MONOCYTES NFR BLD AUTO: 8.3 % (ref 5–12)
NEUTROPHILS NFR BLD AUTO: 7.09 10*3/MM3 (ref 1.7–7)
NEUTROPHILS NFR BLD AUTO: 71.1 % (ref 42.7–76)
NRBC BLD AUTO-RTO: 0 /100 WBC (ref 0–0.2)
PLATELET # BLD AUTO: 264 10*3/MM3 (ref 140–450)
PMV BLD AUTO: 9.7 FL (ref 6–12)
RBC # BLD AUTO: 3.54 10*6/MM3 (ref 3.77–5.28)
TREPONEMA PALLIDUM IGG+IGM AB [PRESENCE] IN SERUM OR PLASMA BY IMMUNOASSAY: NORMAL
WBC NRBC COR # BLD AUTO: 9.97 10*3/MM3 (ref 3.4–10.8)

## 2025-01-07 PROCEDURE — C1755 CATHETER, INTRASPINAL: HCPCS

## 2025-01-07 PROCEDURE — 85025 COMPLETE CBC W/AUTO DIFF WBC: CPT | Performed by: OBSTETRICS & GYNECOLOGY

## 2025-01-07 PROCEDURE — 0503F POSTPARTUM CARE VISIT: CPT | Performed by: STUDENT IN AN ORGANIZED HEALTH CARE EDUCATION/TRAINING PROGRAM

## 2025-01-07 PROCEDURE — 59025 FETAL NON-STRESS TEST: CPT

## 2025-01-07 PROCEDURE — 63710000001 ONDANSETRON ODT 4 MG TABLET DISPERSIBLE: Performed by: OBSTETRICS & GYNECOLOGY

## 2025-01-07 RX ORDER — OXYCODONE HYDROCHLORIDE 5 MG/1
5 TABLET ORAL EVERY 4 HOURS PRN
Status: DISCONTINUED | OUTPATIENT
Start: 2025-01-07 | End: 2025-01-08 | Stop reason: HOSPADM

## 2025-01-07 RX ORDER — DOCUSATE SODIUM 100 MG/1
100 CAPSULE, LIQUID FILLED ORAL 2 TIMES DAILY
Status: DISCONTINUED | OUTPATIENT
Start: 2025-01-07 | End: 2025-01-08 | Stop reason: HOSPADM

## 2025-01-07 RX ORDER — OXYTOCIN/0.9 % SODIUM CHLORIDE 30/500 ML
125 PLASTIC BAG, INJECTION (ML) INTRAVENOUS ONCE AS NEEDED
Status: DISCONTINUED | OUTPATIENT
Start: 2025-01-07 | End: 2025-01-08 | Stop reason: HOSPADM

## 2025-01-07 RX ORDER — ACETAMINOPHEN 325 MG/1
650 TABLET ORAL EVERY 6 HOURS PRN
Status: DISCONTINUED | OUTPATIENT
Start: 2025-01-07 | End: 2025-01-08 | Stop reason: HOSPADM

## 2025-01-07 RX ORDER — HYDROCORTISONE 25 MG/G
1 CREAM TOPICAL AS NEEDED
Status: DISCONTINUED | OUTPATIENT
Start: 2025-01-07 | End: 2025-01-08 | Stop reason: HOSPADM

## 2025-01-07 RX ORDER — PRENATAL VIT/IRON FUM/FOLIC AC 27MG-0.8MG
1 TABLET ORAL DAILY
Status: DISCONTINUED | OUTPATIENT
Start: 2025-01-07 | End: 2025-01-08 | Stop reason: HOSPADM

## 2025-01-07 RX ORDER — SODIUM CHLORIDE 0.9 % (FLUSH) 0.9 %
1-10 SYRINGE (ML) INJECTION AS NEEDED
Status: DISCONTINUED | OUTPATIENT
Start: 2025-01-07 | End: 2025-01-08 | Stop reason: HOSPADM

## 2025-01-07 RX ORDER — METOCLOPRAMIDE HYDROCHLORIDE 5 MG/ML
10 INJECTION INTRAMUSCULAR; INTRAVENOUS EVERY 6 HOURS PRN
Status: DISCONTINUED | OUTPATIENT
Start: 2025-01-07 | End: 2025-01-08 | Stop reason: HOSPADM

## 2025-01-07 RX ORDER — IBUPROFEN 600 MG/1
600 TABLET, FILM COATED ORAL EVERY 6 HOURS PRN
Status: DISCONTINUED | OUTPATIENT
Start: 2025-01-07 | End: 2025-01-08 | Stop reason: HOSPADM

## 2025-01-07 RX ORDER — BISACODYL 10 MG
10 SUPPOSITORY, RECTAL RECTAL DAILY PRN
Status: DISCONTINUED | OUTPATIENT
Start: 2025-01-07 | End: 2025-01-08 | Stop reason: HOSPADM

## 2025-01-07 RX ORDER — PROMETHAZINE HYDROCHLORIDE 12.5 MG/1
12.5 TABLET ORAL EVERY 4 HOURS PRN
Status: DISCONTINUED | OUTPATIENT
Start: 2025-01-07 | End: 2025-01-08 | Stop reason: HOSPADM

## 2025-01-07 RX ORDER — METOCLOPRAMIDE HYDROCHLORIDE 5 MG/ML
10 INJECTION INTRAMUSCULAR; INTRAVENOUS EVERY 6 HOURS
Status: DISCONTINUED | OUTPATIENT
Start: 2025-01-07 | End: 2025-01-07

## 2025-01-07 RX ORDER — ONDANSETRON 4 MG/1
4 TABLET, ORALLY DISINTEGRATING ORAL EVERY 8 HOURS PRN
Status: DISCONTINUED | OUTPATIENT
Start: 2025-01-07 | End: 2025-01-08 | Stop reason: HOSPADM

## 2025-01-07 RX ADMIN — DOCUSATE SODIUM 100 MG: 100 CAPSULE, LIQUID FILLED ORAL at 08:17

## 2025-01-07 RX ADMIN — IBUPROFEN 600 MG: 600 TABLET, FILM COATED ORAL at 18:10

## 2025-01-07 RX ADMIN — DOCUSATE SODIUM 100 MG: 100 CAPSULE, LIQUID FILLED ORAL at 20:06

## 2025-01-07 RX ADMIN — ONDANSETRON 4 MG: 4 TABLET, ORALLY DISINTEGRATING ORAL at 14:18

## 2025-01-07 RX ADMIN — Medication: at 08:16

## 2025-01-07 RX ADMIN — OXYCODONE HYDROCHLORIDE 5 MG: 5 TABLET ORAL at 21:08

## 2025-01-07 RX ADMIN — WITCH HAZEL 1 PAD: 500 SOLUTION RECTAL; TOPICAL at 08:16

## 2025-01-07 RX ADMIN — ACETAMINOPHEN 650 MG: 325 TABLET ORAL at 23:07

## 2025-01-07 RX ADMIN — Medication 1 APPLICATION: at 08:16

## 2025-01-07 RX ADMIN — LEVOTHYROXINE SODIUM 125 MCG: 25 TABLET ORAL at 06:09

## 2025-01-07 RX ADMIN — ACETAMINOPHEN 650 MG: 325 TABLET ORAL at 05:11

## 2025-01-07 RX ADMIN — ACETAMINOPHEN 650 MG: 325 TABLET ORAL at 12:27

## 2025-01-07 RX ADMIN — IBUPROFEN 600 MG: 600 TABLET, FILM COATED ORAL at 08:17

## 2025-01-07 RX ADMIN — OXYCODONE HYDROCHLORIDE 5 MG: 5 TABLET ORAL at 15:34

## 2025-01-07 RX ADMIN — PRENATAL VITAMINS-IRON FUMARATE 27 MG IRON-FOLIC ACID 0.8 MG TABLET 1 TABLET: at 08:17

## 2025-01-07 NOTE — LACTATION NOTE
01/07/25 0844   Maternal Information   Date of Referral 01/07/25   Person Making Referral lactation consultant  (courtesy)   Maternal Reason for Referral breastfeeding currently  (infant in right football hold when LC entered; LC had mother adjust infant's position to place infant further back; with adjustment infant had a more asymmetrical latch)   Maternal Assessment   Left Nipple Symptoms intact;nontender   Right Nipple Symptoms intact;nontender   Maternal Infant Feeding   Infant Positioning clutch/football  (infant; attempted L football hold, infant too sleepy to nurse)   Pain with Feeding no   Latch Assistance verbal guidance offered  (with infant position adjustment)   Breast Pumping   Breast Pumping Interventions other (see comments)  (for missed feedings, if supplementation utilized, too painful latch infant, to stimulate/encourage milk production)   Lactation Referrals   Lactation Referrals outpatient lactation program   Outpatient Lactation Program Lactation Follow-up Date/Time as needed     Courtesy; mother had infant in right football hold when LC entered room; infant latched slightly shallow; had mother adjust infant's position to be further back on side; with adjustment, infant latched deeper; mother encouraged; infant nursed for 18 minutes; no other needs at this time; to call lactation as needed.

## 2025-01-07 NOTE — PROGRESS NOTES
Padilla Delgadillo  : 1995  MRN: [id number]  CSN: [id number]    Hospital Day: 2    Postpartum Day #1  Subjective     CC: hospital follow-up    Her pain is well controlled. Vaginal bleeding is normal in amount. She is ambulating without difficulty. She is passing gas. She is voiding without difficulty. She is breast feeding and it is going well. Her baby is doing well. She has no complaints today .     Objective     Patient Vitals for the past 24 hrs:   BP Temp Temp src Pulse Resp SpO2   25 0820 127/75 98 °F (36.7 °C) Oral 84 16 --   25 0530 125/71 98.1 °F (36.7 °C) Oral 94 18 --   25 0130 122/77 98.3 °F (36.8 °C) Oral 99 16 --   25 0030 139/83 98 °F (36.7 °C) Oral 94 18 --   25 0000 131/79 -- -- 107 -- --   25 2323 121/71 -- -- 102 -- --   25 2308 130/76 -- -- 94 -- --   25 2306 143/84 -- -- 104 -- --   25 2245 -- 98 °F (36.7 °C) Oral -- 16 --   25 126/69 -- -- 110 -- --   25 106/68 -- -- 115 -- --   25 106/68 -- -- 115 -- --   25 116/70 -- -- -- -- --   25 116/67 -- -- 112 -- --   25 111/71 -- -- 104 -- --   25 114/77 -- -- 116 16 --   25 119/75 -- -- 110 16 --   25 117/72 -- -- 105 -- --   25 130/81 -- -- 94 -- --   25 120/75 -- -- 105 -- --   25 1953 109/69 -- -- 92 -- --   25 193 110/66 -- -- 90 -- --   25 192 111/66 -- -- 85 -- --   25 1908 130/87 98.2 °F (36.8 °C) Oral 85 16 --   25 1853 128/85 -- -- 85 -- --   25 1823 120/80 -- -- 94 -- --   25 1809 137/86 -- -- 102 -- --   25 1753 117/78 -- -- 85 -- --   25 1738 115/75 -- -- 102 -- --   25 1723 109/71 -- -- 106 -- --   25 1709 110/70 -- -- 93 -- --   25 1654 111/65 -- -- 102 -- --   25 1605 143/99 -- -- 99 -- --   25 1602 139/91 -- -- 102 -- --   25 1559 134/85 -- -- 97 --  --   01/06/25 1556 144/83 -- -- 96 -- --   01/06/25 1553 148/91 -- -- 99 -- --   01/06/25 1550 145/87 -- -- 104 -- --   01/06/25 1547 142/82 -- -- 101 -- 98 %   01/06/25 1544 142/88 -- -- 104 -- --   01/06/25 1542 -- -- -- 102 -- 98 %   01/06/25 1537 -- -- -- 104 -- 97 %   01/06/25 1345 120/79 98.4 °F (36.9 °C) Oral 85 16 --          General: well developed; well nourished  no acute distress   Abdomen: fundus firm and non-tender   Pelvic: Not performed   Ext: Calves NT     Lab Results   Component Value Date    WBC 8.10 01/06/2025    HGB 11.3 (L) 01/06/2025    HCT 35.2 01/06/2025    MCV 89.8 01/06/2025     01/06/2025    ABORH A POS 10/01/2021    RH Positive 01/06/2025                Assessment   Postpartum Day #1 S/P vaginal delivery  Doing well     Plan   Continue routine postpartum care  Declines circumcision   Discussed discharge instructions today, but likely will DC tomorrow pending baby status.     Lori Chaudhari MD  1/7/2025  09:17 EST

## 2025-01-07 NOTE — L&D DELIVERY NOTE
" [facility]   Vaginal Delivery Note    Patient Name: Sharon Delgadillo  : 1995  MRN: [id number]    Date of Delivery: 2025    Diagnosis     Pre & Post-Delivery:  Intrauterine pregnancy at 39w0d  Labor status:      Term pregnancy  Elective induction of labor           Problem List    Transfer to Postpartum     Review the Delivery Report for details.     Delivery     Delivery:      YOB: 2023   Time of Birth:  Gestational Age 10:24 PM  39w0d     Anesthesia:   Epidural   Delivering clinician: Heidi Brown   Forceps?   No   Vacuum? No    Shoulder dystocia present: No        Delivery narrative:  Viable male infant delivered OA over intact perineum. Anterior shoulder delivered easily followed by posterior shoulder and remainder of infant body. Infant placed on maternal abdomen, bulb suctioned and dried. Umbilical cord clamped x2 and cut after 60 second delay. Placenta then delivered spontaneously with gentle traction, intact with 3VC. Right labial laceration present but superficial; not repaired. Fundus then firm. Counts correct. EBL 125ml.         Infant     Findings: male infant     Infant observations: Weight: 3405 g (7 lb 8.1 oz)  Length: 21.5 in  Observations/Comments:        Apgars: 8  @ 1 minute /    9  @ 5 minutes   Infant Name: Miguel A Ramos     Placenta & Cord         Placenta delivered  Spontaneous at   2025 10:29 PM    Cord:   present.   Nuchal Cord?  yes; Number of nuchal loops present:   1   Cord blood obtained:     Cord gases obtained:      Cord gas results: Venous:  No results found for: \"PHCVEN\", \"BECVEN\"    Arterial:  No results found for: \"PHCART\", \"BECART\"     Repair     Episiotomy: None    No    Lacerations: Yes  Laceration Information  Laceration Repaired?   Perineal: None     Periurethral:       Labial: right No   Sulcus:       Vaginal:       Cervical:            Estimated Blood Loss: Est. Blood Loss (mL): 125 mL (Filed from Delivery Summary) (253) "     Quantitative Blood Loss:          Complications     none    Disposition     Mother to Mother Baby/Postpartum  in stable condition currently.  Baby to remains with mom  in stable condition currently.    Heidi Brown MD  01/06/25  23:11 EST

## 2025-01-07 NOTE — ANESTHESIA POSTPROCEDURE EVALUATION
Patient: Sharon Delgadillo    Procedure Summary       Date: 01/06/25 Room / Location:     Anesthesia Start: 1531 Anesthesia Stop: 2229    Procedure: LABOR ANALGESIA Diagnosis:     Scheduled Providers:  Provider: Raman Burgos DO    Anesthesia Type: epidural ASA Status: 2            Anesthesia Type: epidural    Vitals  Vitals Value Taken Time   /71 01/07/25 0530   Temp 98.1 °F (36.7 °C) 01/07/25 0530   Pulse 94 01/07/25 0530   Resp 18 01/07/25 0530   SpO2 98 % 01/06/25 1547           Post Anesthesia Care and Evaluation    Patient location during evaluation: bedside  Patient participation: complete - patient participated  Level of consciousness: awake and alert  Pain management: adequate    Airway patency: patent  Anesthetic complications: No anesthetic complications    Cardiovascular status: acceptable  Respiratory status: acceptable  Hydration status: acceptable  Post Neuraxial Block status: Motor and sensory function returned to baseline and No signs or symptoms of PDPH

## 2025-01-08 VITALS
HEIGHT: 67 IN | HEART RATE: 94 BPM | SYSTOLIC BLOOD PRESSURE: 137 MMHG | RESPIRATION RATE: 16 BRPM | WEIGHT: 247 LBS | TEMPERATURE: 98 F | DIASTOLIC BLOOD PRESSURE: 79 MMHG | BODY MASS INDEX: 38.77 KG/M2 | OXYGEN SATURATION: 98 %

## 2025-01-08 PROCEDURE — 0503F POSTPARTUM CARE VISIT: CPT | Performed by: STUDENT IN AN ORGANIZED HEALTH CARE EDUCATION/TRAINING PROGRAM

## 2025-01-08 RX ORDER — OXYCODONE HYDROCHLORIDE 5 MG/1
5 TABLET ORAL EVERY 4 HOURS PRN
Qty: 2 TABLET | Refills: 0 | Status: SHIPPED | OUTPATIENT
Start: 2025-01-08

## 2025-01-08 RX ORDER — ACETAMINOPHEN 325 MG/1
650 TABLET ORAL EVERY 6 HOURS PRN
Qty: 30 TABLET | Refills: 1 | Status: SHIPPED | OUTPATIENT
Start: 2025-01-08

## 2025-01-08 RX ORDER — PSEUDOEPHEDRINE HCL 30 MG
100 TABLET ORAL 2 TIMES DAILY
Qty: 30 CAPSULE | Refills: 0 | Status: SHIPPED | OUTPATIENT
Start: 2025-01-08

## 2025-01-08 RX ORDER — IBUPROFEN 600 MG/1
600 TABLET, FILM COATED ORAL EVERY 6 HOURS PRN
Qty: 30 TABLET | Refills: 1 | Status: SHIPPED | OUTPATIENT
Start: 2025-01-08

## 2025-01-08 RX ADMIN — DOCUSATE SODIUM 100 MG: 100 CAPSULE, LIQUID FILLED ORAL at 09:06

## 2025-01-08 RX ADMIN — IBUPROFEN 600 MG: 600 TABLET, FILM COATED ORAL at 09:06

## 2025-01-08 RX ADMIN — PRENATAL VITAMINS-IRON FUMARATE 27 MG IRON-FOLIC ACID 0.8 MG TABLET 1 TABLET: at 09:06

## 2025-01-08 RX ADMIN — LEVOTHYROXINE SODIUM 125 MCG: 25 TABLET ORAL at 06:44

## 2025-01-08 RX ADMIN — OXYCODONE HYDROCHLORIDE 5 MG: 5 TABLET ORAL at 00:59

## 2025-01-08 NOTE — DISCHARGE SUMMARY
Discharge Summary     Padilla Delgadillo  : 1995  MRN: [id number]  CSN: [id number]    Date of Admission: 2025   Date of Discharge:  2025   Delivering Physician: Heidi Brown       Admission Diagnosis: Term pregnancy [Z34.90]   Discharge Diagnosis: Same as above plus  Pregnancy at 39w0d - delivered       Procedures: 2025 - Vaginal, Spontaneous      Hospital Course  Patient is a 29 y.o.  admitted for elective IOL at 39w0d had a vaginal birth.  Pregnancy complicated by treated hypothyroidism. Her postpartum course was without complications.  On PPD # 2 she was ready for discharge.  She had normal lochia and pain well controlled with oral medications.    Infant  male fetus weighing 3405 g (7 lb 8.1 oz)  Apgars -  8 @ 1 minute /  9 @ 5 minutes.    Discharge labs  Lab Results   Component Value Date    WBC 9.97 2025    HGB 10.0 (L) 2025    HCT 32.2 (L) 2025     2025       Discharge Medications     Discharge Medications        New Medications        Instructions Start Date   acetaminophen 325 MG tablet  Commonly known as: TYLENOL   650 mg, Oral, Every 6 Hours PRN      docusate sodium 100 MG capsule   100 mg, Oral, 2 Times Daily      ibuprofen 600 MG tablet  Commonly known as: ADVIL,MOTRIN   600 mg, Oral, Every 6 Hours PRN             Continue These Medications        Instructions Start Date   IRON PO   1 tablet, Daily      levothyroxine 125 MCG tablet  Commonly known as: Synthroid   125 mcg, Oral, Daily      ondansetron ODT 4 MG disintegrating tablet  Commonly known as: ZOFRAN-ODT   4 mg, Translingual, Every 8 Hours PRN      prenatal (CLASSIC) vitamin 28-0.8 MG tablet tablet  Generic drug: prenatal vitamin   Daily               Discharge Disposition Home or Self Care   Condition on Discharge: good   Follow-up: 6 weeks with Dr. Watson Chaudhari MD  2025

## 2025-01-08 NOTE — PROGRESS NOTES
Padilla Delgadillo  : 1995  MRN: [id number]  CSN: [id number]    Hospital Day: 3    Postpartum Day #2  Subjective     CC: hospital follow-up    Her pain is well controlled. Vaginal bleeding is normal in amount. She is ambulating without difficulty. She is passing gas. She is voiding without difficulty. She is breast feeding and it is going well. Her baby is doing well. She wants to go home today..     Objective     Patient Vitals for the past 24 hrs:   BP Temp Temp src Pulse Resp   25 2253 122/70 97.9 °F (36.6 °C) -- 77 16   25 1556 119/71 98.5 °F (36.9 °C) Oral 74 16   25 0820 127/75 98 °F (36.7 °C) Oral 84 16          General: well developed; well nourished  no acute distress   Abdomen: fundus firm and non-tender   Pelvic: Not performed   Ext: Calves NT     Lab Results   Component Value Date    WBC 9.97 2025    HGB 10.0 (L) 2025    HCT 32.2 (L) 2025    MCV 91.0 2025     2025    ABORH A POS 10/01/2021    RH Positive 2025     Results from last 7 days   Lab Units 25  1143   TREPONEMA PALLIDUM AB TOTAL  Non-Reactive            Assessment   Postpartum Day #2 S/P vaginal delivery  Doing well     Plan   Discharge to home  Advised no tampons or intercourse for 6 weeks.  D/C questions all answered  Follow-up appointment in 6 week(s)    Lori Chaudhari MD  2025  07:57 EST

## 2025-01-17 ENCOUNTER — MATERNAL SCREENING (OUTPATIENT)
Dept: CALL CENTER | Facility: HOSPITAL | Age: 30
End: 2025-01-17
Payer: COMMERCIAL

## 2025-01-17 NOTE — OUTREACH NOTE
Maternal Screening Survey      Flowsheet Row Responses   Facility patient discharged from? [facility]   Attempt successful? Yes   Call start time 1016   Call end time 1023   I have been able to laugh and see the funny side of things. 0   I have looked forward with enjoyment to things. 0   I have blamed myself unnecessarily when things went wrong. 0   I have been anxious or worried for no good reason. 0   I have felt scared or panicky for no good reason. 0   Things have been getting on top of me. 0   I have been so unhappy that I have had difficulty sleeping. 0   I have felt sad or miserable. 0   I have been so unhappy that I have been crying. 0   The thought of harming myself has occurred to me. 0   Eunice  Depression Scale Total 0   Did any of your parents have problems with alcohol or drug use? No   Do any of your peers have problems with alcohol or drug use? No   Does your partner have problems with alcohol or drug use? No   Before you were pregnant did you have problems with alcohol or drug use? (past) No   In the past month, did you drink beer, wine, liquor or use any other drugs? (pregnancy) No   Maternal Screening call completed Yes   Call end time 1023              Meena WALSH - Registered Nurse

## 2025-01-27 ENCOUNTER — APPOINTMENT (OUTPATIENT)
Dept: CT IMAGING | Facility: HOSPITAL | Age: 30
End: 2025-01-27
Payer: COMMERCIAL

## 2025-01-27 ENCOUNTER — HOSPITAL ENCOUNTER (EMERGENCY)
Facility: HOSPITAL | Age: 30
Discharge: HOME | End: 2025-01-28
Attending: EMERGENCY MEDICINE | Admitting: EMERGENCY MEDICINE
Payer: COMMERCIAL

## 2025-01-27 DIAGNOSIS — L03.312 CELLULITIS OF LOWER BACK: Primary | ICD-10-CM

## 2025-01-27 LAB
ALBUMIN SERPL-MCNC: 4.3 G/DL (ref 3.5–5.2)
ALBUMIN/GLOB SERPL: 1.5 G/DL
ALP SERPL-CCNC: 130 U/L (ref 39–117)
ALT SERPL W P-5'-P-CCNC: 11 U/L (ref 1–33)
ANION GAP SERPL CALCULATED.3IONS-SCNC: 13 MMOL/L (ref 5–15)
AST SERPL-CCNC: 12 U/L (ref 1–32)
BASOPHILS # BLD AUTO: 0.07 10*3/MM3 (ref 0–0.2)
BASOPHILS NFR BLD AUTO: 0.8 % (ref 0–1.5)
BILIRUB SERPL-MCNC: 0.2 MG/DL (ref 0–1.2)
BUN SERPL-MCNC: 17 MG/DL (ref 6–20)
BUN/CREAT SERPL: 19.3 (ref 7–25)
CALCIUM SPEC-SCNC: 9.7 MG/DL (ref 8.6–10.5)
CHLORIDE SERPL-SCNC: 104 MMOL/L (ref 98–107)
CO2 SERPL-SCNC: 24 MMOL/L (ref 22–29)
CREAT SERPL-MCNC: 0.88 MG/DL (ref 0.57–1)
D-LACTATE SERPL-SCNC: 0.9 MMOL/L (ref 0.5–2)
DEPRECATED RDW RBC AUTO: 44.7 FL (ref 37–54)
EGFRCR SERPLBLD CKD-EPI 2021: 92.5 ML/MIN/1.73
EOSINOPHIL # BLD AUTO: 0.18 10*3/MM3 (ref 0–0.4)
EOSINOPHIL NFR BLD AUTO: 2 % (ref 0.3–6.2)
ERYTHROCYTE [DISTWIDTH] IN BLOOD BY AUTOMATED COUNT: 13.7 % (ref 12.3–15.4)
GLOBULIN UR ELPH-MCNC: 2.8 GM/DL
GLUCOSE SERPL-MCNC: 93 MG/DL (ref 65–99)
HCT VFR BLD AUTO: 43.6 % (ref 34–46.6)
HGB BLD-MCNC: 13.9 G/DL (ref 12–15.9)
IMM GRANULOCYTES # BLD AUTO: 0.02 10*3/MM3 (ref 0–0.05)
IMM GRANULOCYTES NFR BLD AUTO: 0.2 % (ref 0–0.5)
LYMPHOCYTES # BLD AUTO: 2.69 10*3/MM3 (ref 0.7–3.1)
LYMPHOCYTES NFR BLD AUTO: 29.4 % (ref 19.6–45.3)
MCH RBC QN AUTO: 28.4 PG (ref 26.6–33)
MCHC RBC AUTO-ENTMCNC: 31.9 G/DL (ref 31.5–35.7)
MCV RBC AUTO: 89 FL (ref 79–97)
MONOCYTES # BLD AUTO: 0.54 10*3/MM3 (ref 0.1–0.9)
MONOCYTES NFR BLD AUTO: 5.9 % (ref 5–12)
NEUTROPHILS NFR BLD AUTO: 5.64 10*3/MM3 (ref 1.7–7)
NEUTROPHILS NFR BLD AUTO: 61.7 % (ref 42.7–76)
NRBC BLD AUTO-RTO: 0 /100 WBC (ref 0–0.2)
PLAT MORPH BLD: NORMAL
PLATELET # BLD AUTO: 299 10*3/MM3 (ref 140–450)
PMV BLD AUTO: 10.5 FL (ref 6–12)
POTASSIUM SERPL-SCNC: 4.5 MMOL/L (ref 3.5–5.2)
PROT SERPL-MCNC: 7.1 G/DL (ref 6–8.5)
RBC # BLD AUTO: 4.9 10*6/MM3 (ref 3.77–5.28)
RBC MORPH BLD: NORMAL
SODIUM SERPL-SCNC: 141 MMOL/L (ref 136–145)
WBC MORPH BLD: NORMAL
WBC NRBC COR # BLD AUTO: 9.14 10*3/MM3 (ref 3.4–10.8)

## 2025-01-27 PROCEDURE — 72131 CT LUMBAR SPINE W/O DYE: CPT

## 2025-01-27 PROCEDURE — 80053 COMPREHEN METABOLIC PANEL: CPT | Performed by: EMERGENCY MEDICINE

## 2025-01-27 PROCEDURE — 99284 EMERGENCY DEPT VISIT MOD MDM: CPT

## 2025-01-27 PROCEDURE — 36415 COLL VENOUS BLD VENIPUNCTURE: CPT

## 2025-01-27 PROCEDURE — 85025 COMPLETE CBC W/AUTO DIFF WBC: CPT | Performed by: EMERGENCY MEDICINE

## 2025-01-27 PROCEDURE — 83605 ASSAY OF LACTIC ACID: CPT | Performed by: EMERGENCY MEDICINE

## 2025-01-27 PROCEDURE — 85007 BL SMEAR W/DIFF WBC COUNT: CPT | Performed by: EMERGENCY MEDICINE

## 2025-01-28 VITALS
SYSTOLIC BLOOD PRESSURE: 142 MMHG | DIASTOLIC BLOOD PRESSURE: 75 MMHG | HEIGHT: 67 IN | OXYGEN SATURATION: 97 % | TEMPERATURE: 98.4 F | BODY MASS INDEX: 36.1 KG/M2 | HEART RATE: 100 BPM | RESPIRATION RATE: 19 BRPM | WEIGHT: 230 LBS

## 2025-01-28 RX ORDER — SULFAMETHOXAZOLE AND TRIMETHOPRIM 800; 160 MG/1; MG/1
1 TABLET ORAL ONCE
Status: DISCONTINUED | OUTPATIENT
Start: 2025-01-28 | End: 2025-01-28

## 2025-01-28 RX ADMIN — AMOXICILLIN AND CLAVULANATE POTASSIUM 1 TABLET: 875; 125 TABLET, FILM COATED ORAL at 00:21

## 2025-01-28 NOTE — ED PROVIDER NOTES
[place]    EMERGENCY DEPARTMENT ENCOUNTER      Pt Name: Sharon Delgadillo  MRN: [id number]  YOB: 1995  Date of evaluation: 1/27/2025  Provider: Kalia Guerin MD    CHIEF COMPLAINT       Chief Complaint   Patient presents with    Post-op Problem         HISTORY OF PRESENT ILLNESS   Sharon Delgadillo is a 29 y.o. female who presents to the emergency department for evaluation of redness swelling and pain in the low back.  Patient states she had an epidural anesthesia earlier this month, everything has been going well in her postpartum period until the past few days.  She noticed some redness swelling and mild discomfort in her low back at the site of her epidural on Friday, 3 days ago that worsened until a small area opened up and drained from her low back when she was in the shower this evening and she is worried about infection.  At this time she states her pain is very minimal, and seems to be isolated in the skin of her low back rather than deep inside.  She has not had fevers or chills.  She has not had any urinary incontinence or retention, no bowel incontinence, no numbness or weakness in the legs.    REVIEW OF SYSTEMS     ROS:  A chief complaint appropriate review of systems was completed and is negative except as noted in the HPI.      PAST MEDICAL HISTORY     Past Medical History:   Diagnosis Date    ADHD (attention deficit hyperactivity disorder) 2017    Depression 2021    Glaucoma 2015    Monitored yearly    Hyperlipidemia     resolved    Hypothyroidism     Obesity 2021    Noted after giving birth    Ovarian cyst     Urinary tract infection 2024         SURGICAL HISTORY       Past Surgical History:   Procedure Laterality Date    KNEE ACL RECONSTRUCTION  06/2014    WISDOM TOOTH EXTRACTION Bilateral          CURRENT MEDICATIONS     No current facility-administered medications for this encounter.    Current Outpatient Medications:     acetaminophen (TYLENOL) 325 MG tablet, Take 2 tablets by  mouth Every 6 Hours As Needed for Mild pain unrelieved by ibuprofen. (Stagger doses 3 hours after dose of ibuprofen), Disp: 30 tablet, Rfl: 1    amoxicillin-clavulanate (AUGMENTIN) 875-125 MG per tablet, Take 1 tablet by mouth 2 (Two) Times a Day for 7 days., Disp: 14 tablet, Rfl: 0    docusate sodium 100 MG capsule, Take 1 capsule by mouth 2 (Two) Times a Day., Disp: 30 capsule, Rfl: 0    Ferrous Sulfate (IRON PO), Take 1 tablet by mouth Daily., Disp: , Rfl:     ibuprofen (ADVIL,MOTRIN) 600 MG tablet, Take 1 tablet by mouth Every 6 (Six) Hours As Needed for Mild Pain, Disp: 30 tablet, Rfl: 1    levothyroxine (Synthroid) 125 MCG tablet, Take 1 tablet by mouth Daily., Disp: 30 tablet, Rfl: 11    ondansetron ODT (ZOFRAN-ODT) 4 MG disintegrating tablet, Place 1 tablet on the tongue Every 8 (Eight) Hours As Needed for Nausea or Vomiting., Disp: 27 tablet, Rfl: 1    oxyCODONE (ROXICODONE) 5 MG immediate release tablet, Take 1 tablet by mouth Every 4 (Four) Hours As Needed for Moderate Pain., Disp: 2 tablet, Rfl: 0    prenatal vitamin (prenatal, CLASSIC, vitamin) tablet, Take  by mouth Daily., Disp: , Rfl:     ALLERGIES     Patient has no known allergies.    FAMILY HISTORY       Family History   Problem Relation Age of Onset    Thyroid disease Mother     Depression Mother     Drug abuse Mother         Opioid abuse (perscribed only)    Mental illness Mother         Depression and bipolar    Drug abuse Sister         Opiod abuse leading to street drug use. DOC  herion and meth    Early death Sister         Cause of death never found. Autopsy was not done. Only info is drugs were not in her system. Had history of drug related heart problems that were uncontrolled.    Mental illness Sister         Depression and bipolar    Osteoporosis Paternal Grandmother     Vision loss Paternal Grandmother         Glaucoma    Diabetes Maternal Grandmother     Hyperlipidemia Maternal Grandmother         Extremely obese    Thyroid disease  "Maternal Grandmother     Breast cancer Neg Hx     Ovarian cancer Neg Hx     Uterine cancer Neg Hx     Colon cancer Neg Hx           SOCIAL HISTORY       Social History     Socioeconomic History    Marital status: Single    Number of children: 1   Tobacco Use    Smoking status: Never     Passive exposure: Never    Smokeless tobacco: Never   Vaping Use    Vaping status: Never Used   Substance and Sexual Activity    Alcohol use: Not Currently     Comment: Glass of wine once a month    Drug use: Never    Sexual activity: Yes     Partners: Male     Birth control/protection: Condom, None         PHYSICAL EXAM    (up to 7 for level 4, 8 or more for level 5)     Vitals:    01/27/25 2112 01/28/25 0022 01/28/25 0039   BP: (!) 158/101  142/75   BP Location:   Right arm   Patient Position:   Sitting   Pulse: 105 100    Resp: 19     Temp: 98.4 °F (36.9 °C)     TempSrc: Oral     SpO2: 99% 97%    Weight: 104 kg (230 lb)     Height: 170.2 cm (67\")         Physical Exam  Constitutional:       General: She is not in acute distress.  HENT:      Head: Normocephalic and atraumatic.   Eyes:      Conjunctiva/sclera: Conjunctivae normal.      Pupils: Pupils are equal, round, and reactive to light.   Cardiovascular:      Rate and Rhythm: Normal rate and regular rhythm.      Pulses: Normal pulses.      Heart sounds: No murmur heard.     No gallop.   Pulmonary:      Effort: Pulmonary effort is normal. No respiratory distress.   Abdominal:      General: Abdomen is flat. There is no distension.      Tenderness: There is no abdominal tenderness.   Musculoskeletal:         General: No deformity. Normal range of motion.      Comments: On the low back there is a punctate wound with overlying scab with surrounding erythema.  There is slight induration, there is no fluctuance.  There is no tenderness over the lumbar spine.  There is normal range of motion of the lower extremities which are warm dry and well-perfused   Skin:     General: Skin is warm " and dry.      Capillary Refill: Capillary refill takes less than 2 seconds.   Neurological:      General: No focal deficit present.      Mental Status: She is alert and oriented to person, place, and time.      Comments: GCS 15.  Cranial Nerves II-XII intact without deficit.  Strength 5/5 in the bilateral upper extremities.  Strength 5/5 in the bilateral lower extremities.  Sensation to light touch intact throughout.  Cerebellar function intact via finger-nose-finger.     Psychiatric:         Mood and Affect: Mood normal.         Behavior: Behavior normal.            DIAGNOSTIC RESULTS     EKG: All EKGs are interpreted by the Emergency Department Physician who either signs or Co-signs this chart in the absence of a cardiologist.    No orders to display         RADIOLOGY:   [x] Radiologist's Report Reviewed:  CT Lumbar Spine Without Contrast   Final Result   Impression:   Small area of subcutaneous inflammation and induration within right posterior paracentral soft tissues at the L4-5 level suggesting cellulitis. No organized or drainable abscess.                  Electronically Signed: Norberto Hawkins MD     1/27/2025 10:01 PM EST     Workstation ID: [id number]          I ordered and independently reviewed the above noted radiographic studies.        LABS:  I independently interpreted all laboratory studies conducted during this ED visit.  The results of these studies can be seen below and my independent interpretation in the ED course      EMERGENCY DEPARTMENT COURSE and DIFFERENTIAL DIAGNOSIS/MDM:   Vitals:  AS OF 01:16 EST    BP - 142/75  HR - 100  TEMP - 98.4 °F (36.9 °C) (Oral)  O2 SATS - 97%        Discussion below represents my analysis of pertinent findings related to patient's condition, differential diagnosis, treatment plan and final disposition.      Differential diagnosis:  The differential diagnosis associated with the patient's presentation includes: Based on the patient's physical exam findings I suspect  a mild cellulitis.  There is some concern for a underlying abscess and emergent pathology such as spinal epidural abscess or meningeal infection is considered but felt to be less likely at this time.  Patient has a normal neurologic examination, no overt infectious symptoms.      Independent interpretations (ECG/rhythm strip/X-ray/US/CT scan): See ED course      Additional sources:  Discussed/obtained information from independent historians:   [] Spouse:   [] Parent:   [] Friend:   [] EMS:   [] Other:    External record review:  1/8/2025 reviewed most recent discharge summary, patient hospitalized for induction of labor and vaginal delivery of her child      Patient's care impacted by:   [] Diabetes   [] Hypertension   [] Coronary Artery Disease   [] Cancer   [x] Other: Recent anesthetic procedure    Care significantly affected by Social Determinants of Health (housing and economic circumstances, unemployment)    [] Yes     [x] No   If yes, Patient's care significantly limited by  Social Determinants of Health including:    [] Inadequate housing    [] Low income    [] Alcoholism and drug addiction in family    [] Problems related to primary support group    [] Unemployment    [] Problems related to employment    [] Other Social Determinants of Health:     I considered prescription management with:    [] Pain medication:   [] Antiviral:   [x] Antibiotic: Augmentin   [] Other:    Additional orders considered but not ordered:  The following testing was considered but ultimately not selected: MRI of the spine    ED Course:    ED Course as of 01/28/25 0116   Mon Jan 27, 2025 2208 CT scan of the lumbar spine independently interpreted by myself demonstrates inflammatory changes within the subcutaneous tissue in the right posterior paracentral soft tissues.  There is no abscess.  There is no identified extension into the musculature of the back or into the spine [KB]   Tue Jan 28, 2025   0115 Laboratory workup  independently interpreted by myself demonstrates no acute abnormalities [KB]      ED Course User Index  [KB] Kalia Guerin MD         Diagnostic lab and imaging studies were conducted.  I discussed the results of patient's lab and imaging studies with her at the bedside.  We discussed her diagnosis of cellulitis, treatment plan with oral antibiotic Augmentin in consideration of her breast-feeding status.  Patient continues to be overall well-appearing on reevaluation and she feels comfortable with this plan.  She was counseled strictly on return precautions.    Prior to discharge from the emergency department I discussed with the patient and any family members present the current diagnosis, treatment plan, recommendations regarding follow-up with a primary care doctor or specialist, general emergency room return precautions as well as return precautions specific to their diagnosis and treatment.  The patient/family indicated understanding of these instructions.  Time was allotted to answer questions at that time and throughout the ED course.       PROCEDURES:  Procedures    CRITICAL CARE TIME        CONSULTS       FINAL IMPRESSION      1. Cellulitis of lower back    2. Mother currently breast-feeding          DISPOSITION/PLAN     ED Disposition       ED Disposition   Discharge    Condition   Stable    Comment   --                 Comment: Please note this report has been produced using speech recognition software.      Kalia Guerin MD  Attending Emergency Physician    Recent Results (from the past 24 hours)   Comprehensive Metabolic Panel    Collection Time: 01/27/25 11:02 PM    Specimen: Blood   Result Value Ref Range    Glucose 93 65 - 99 mg/dL    BUN 17 6 - 20 mg/dL    Creatinine 0.88 0.57 - 1.00 mg/dL    Sodium 141 136 - 145 mmol/L    Potassium 4.5 3.5 - 5.2 mmol/L    Chloride 104 98 - 107 mmol/L    CO2 24.0 22.0 - 29.0 mmol/L    Calcium 9.7 8.6 - 10.5 mg/dL    Total Protein 7.1 6.0 - 8.5 g/dL    Albumin 4.3  3.5 - 5.2 g/dL    ALT (SGPT) 11 1 - 33 U/L    AST (SGOT) 12 1 - 32 U/L    Alkaline Phosphatase 130 (H) 39 - 117 U/L    Total Bilirubin 0.2 0.0 - 1.2 mg/dL    Globulin 2.8 gm/dL    A/G Ratio 1.5 g/dL    BUN/Creatinine Ratio 19.3 7.0 - 25.0    Anion Gap 13.0 5.0 - 15.0 mmol/L    eGFR 92.5 >60.0 mL/min/1.73   Lactic Acid, Plasma    Collection Time: 01/27/25 11:02 PM    Specimen: Blood   Result Value Ref Range    Lactate 0.9 0.5 - 2.0 mmol/L   CBC Auto Differential    Collection Time: 01/27/25 11:02 PM    Specimen: Blood   Result Value Ref Range    WBC 9.14 3.40 - 10.80 10*3/mm3    RBC 4.90 3.77 - 5.28 10*6/mm3    Hemoglobin 13.9 12.0 - 15.9 g/dL    Hematocrit 43.6 34.0 - 46.6 %    MCV 89.0 79.0 - 97.0 fL    MCH 28.4 26.6 - 33.0 pg    MCHC 31.9 31.5 - 35.7 g/dL    RDW 13.7 12.3 - 15.4 %    RDW-SD 44.7 37.0 - 54.0 fl    MPV 10.5 6.0 - 12.0 fL    Platelets 299 140 - 450 10*3/mm3    Neutrophil % 61.7 42.7 - 76.0 %    Lymphocyte % 29.4 19.6 - 45.3 %    Monocyte % 5.9 5.0 - 12.0 %    Eosinophil % 2.0 0.3 - 6.2 %    Basophil % 0.8 0.0 - 1.5 %    Immature Grans % 0.2 0.0 - 0.5 %    Neutrophils, Absolute 5.64 1.70 - 7.00 10*3/mm3    Lymphocytes, Absolute 2.69 0.70 - 3.10 10*3/mm3    Monocytes, Absolute 0.54 0.10 - 0.90 10*3/mm3    Eosinophils, Absolute 0.18 0.00 - 0.40 10*3/mm3    Basophils, Absolute 0.07 0.00 - 0.20 10*3/mm3    Immature Grans, Absolute 0.02 0.00 - 0.05 10*3/mm3    nRBC 0.0 0.0 - 0.2 /100 WBC   Scan Slide    Collection Time: 01/27/25 11:02 PM    Specimen: Blood   Result Value Ref Range    RBC Morphology Normal Normal    WBC Morphology Normal Normal    Platelet Morphology Normal Normal     Note: In addition to lab results from this visit, the labs listed above may include labs taken at another facility or during a different encounter within the last 24 hours. Please correlate lab times with ED admission and discharge times for further clarification of the services performed during this visit.                  Kalia Guerin MD  01/28/25 0114

## 2025-01-28 NOTE — DISCHARGE INSTRUCTIONS
Take prescribed antibiotic Augmentin for treatment of an infection in your low back.  Return to the ER if you have any new or worsening symptoms especially neurologic symptoms such as numbness or weakness or bowel incontinence.  Follow-up with your primary doctor.

## 2025-02-17 ENCOUNTER — POSTPARTUM VISIT (OUTPATIENT)
Dept: OBSTETRICS AND GYNECOLOGY | Facility: CLINIC | Age: 30
End: 2025-02-17
Payer: COMMERCIAL

## 2025-02-17 VITALS
WEIGHT: 228.6 LBS | HEIGHT: 67 IN | SYSTOLIC BLOOD PRESSURE: 130 MMHG | BODY MASS INDEX: 35.88 KG/M2 | DIASTOLIC BLOOD PRESSURE: 84 MMHG

## 2025-02-17 NOTE — PROGRESS NOTES
"Subjective   Chief Complaint   Patient presents with    Postpartum Care     Epidural site has an infection / 6wk pp vaginal delivery / she thinks she might need to be medicated     Sharon Delgadillo is a 29 y.o. year old  presenting to be seen for her postpartum visit.  She had a vaginal delivery.  Her son is doing well. Her epidural site was infected, she went to ED 25 but no problems since completing antibiotics.     She does have concerns about post-partum blues/depression. Feeling very irritable, tearful but no thoughts of hurting herself or others. Interested in starting medication. Has never taken depression meds in the past.   [place] Score = 14  She is breastfeeding.   Since delivery she has not been sexually active.  For ongoing contraception, her plans are vasectomy. She does not want back up contraception at this time.     The following portions of the patient's history were reviewed and updated as appropriate:current medications and allergies    Social History    Tobacco Use      Smoking status: Never        Passive exposure: Never      Smokeless tobacco: Never      Review of Systems  Constitutional POS: nothing reported    NEG: anorexia or night sweats   Genitourinary POS: nothing reported    NEG: dysuria or hematuria      Gastointestinal POS: nothing reported    NEG: bloating, change in bowel habits, melena, or reflux symptoms   Breast POS: nothing reported    NEG: persistent breast lump, skin dimpling, or nipple discharge        Objective   /84   Ht 170.2 cm (67.01\")   Wt 104 kg (228 lb 9.6 oz)   Breastfeeding Yes   BMI 35.80 kg/m²     General:  well developed; well nourished  no acute distress  mentation appropriate   Abdomen: soft, non-tender; no masses   Pelvis: Not performed.          Assessment   Normal 6 week postpartum exam S/P vaginal delivery  Postpartum depression     Plan   BC options reviewed and compared today: abstinence and vasectomy. Plans for vasectomy and " declines bridge birth control   Start zoloft for PP depression today   Follow up for annual exam and mood check in 1 month - will need Pap at annual exam.     New Medications Ordered This Visit   Medications    sertraline (Zoloft) 50 MG tablet     Sig: Take 1 tablet by mouth Daily.     Dispense:  30 tablet     Refill:  5          This note was electronically signed.    Lori Chaudhari MD  Obstetrics and Gynecology  [facility]

## 2025-03-07 ENCOUNTER — TELEPHONE (OUTPATIENT)
Dept: OBSTETRICS AND GYNECOLOGY | Facility: CLINIC | Age: 30
End: 2025-03-07
Payer: COMMERCIAL

## 2025-03-07 NOTE — TELEPHONE ENCOUNTER
LVM for pt to call the office to r/s her 03/17 appt due to provider being out of office. Appt was moved to 03/24.

## 2025-07-01 DIAGNOSIS — Z11.1 SCREENING FOR TUBERCULOSIS: ICD-10-CM

## 2025-07-01 DIAGNOSIS — E03.8 OTHER SPECIFIED HYPOTHYROIDISM: Primary | ICD-10-CM

## 2025-07-22 ENCOUNTER — LAB (OUTPATIENT)
Dept: INTERNAL MEDICINE | Age: 30
End: 2025-07-22
Payer: COMMERCIAL

## 2025-07-22 DIAGNOSIS — Z00.00 HEALTHCARE MAINTENANCE: Primary | ICD-10-CM

## 2025-07-22 PROCEDURE — 36415 COLL VENOUS BLD VENIPUNCTURE: CPT | Performed by: INTERNAL MEDICINE

## 2025-07-22 PROCEDURE — 84443 ASSAY THYROID STIM HORMONE: CPT | Performed by: INTERNAL MEDICINE

## 2025-07-22 PROCEDURE — 86480 TB TEST CELL IMMUN MEASURE: CPT | Performed by: INTERNAL MEDICINE

## 2025-07-23 ENCOUNTER — RESULTS FOLLOW-UP (OUTPATIENT)
Dept: INTERNAL MEDICINE | Age: 30
End: 2025-07-23
Payer: COMMERCIAL

## 2025-07-23 DIAGNOSIS — Z11.1 SCREENING FOR TUBERCULOSIS: ICD-10-CM

## 2025-07-23 DIAGNOSIS — E03.8 OTHER SPECIFIED HYPOTHYROIDISM: ICD-10-CM

## 2025-07-23 LAB — TSH SERPL DL<=0.05 MIU/L-ACNC: 35.3 UIU/ML (ref 0.27–4.2)

## 2025-07-23 RX ORDER — LEVOTHYROXINE SODIUM 125 UG/1
125 TABLET ORAL DAILY
Qty: 90 TABLET | Refills: 0 | Status: SHIPPED | OUTPATIENT
Start: 2025-07-23 | End: 2026-07-23

## 2025-07-24 LAB
GAMMA INTERFERON BACKGROUND BLD IA-ACNC: 0.01 IU/ML
M TB IFN-G BLD-IMP: NEGATIVE
M TB IFN-G CD4+ BCKGRND COR BLD-ACNC: 0.02 IU/ML
M TB IFN-G CD4+CD8+ BCKGRND COR BLD-ACNC: 0.04 IU/ML
MITOGEN IGNF BCKGRD COR BLD-ACNC: >10 IU/ML
SERVICE CMNT-IMP: NORMAL

## 2025-07-25 NOTE — TELEPHONE ENCOUNTER
----- Message from Curtis Calvert sent at 7/25/2025  6:44 AM EDT -----  Plz call her that QG test neg and see if she wants us to print out.  I already told her about her tsh.  She was not taking her Synthroid  ----- Message -----  From: Lab, Background User  Sent: 7/24/2025   7:09 PM EDT  To: Curtis Calvert MD